# Patient Record
Sex: MALE | Race: WHITE | NOT HISPANIC OR LATINO | Employment: OTHER | URBAN - METROPOLITAN AREA
[De-identification: names, ages, dates, MRNs, and addresses within clinical notes are randomized per-mention and may not be internally consistent; named-entity substitution may affect disease eponyms.]

---

## 2017-05-05 ENCOUNTER — AMBULATORY - HEALTHEAST (OUTPATIENT)
Dept: CARDIOLOGY | Facility: CLINIC | Age: 67
End: 2017-05-05

## 2017-05-08 ENCOUNTER — OFFICE VISIT - HEALTHEAST (OUTPATIENT)
Dept: CARDIOLOGY | Facility: CLINIC | Age: 67
End: 2017-05-08

## 2017-05-08 ENCOUNTER — AMBULATORY - HEALTHEAST (OUTPATIENT)
Dept: CARDIOLOGY | Facility: CLINIC | Age: 67
End: 2017-05-08

## 2017-05-08 DIAGNOSIS — Z82.49 FAMILY HISTORY OF EARLY CAD: ICD-10-CM

## 2017-05-08 DIAGNOSIS — I48.3 TYPICAL ATRIAL FLUTTER (H): ICD-10-CM

## 2017-05-08 DIAGNOSIS — G47.33 OSA ON CPAP: ICD-10-CM

## 2017-05-08 ASSESSMENT — MIFFLIN-ST. JEOR: SCORE: 2286.73

## 2017-05-10 ENCOUNTER — HOSPITAL ENCOUNTER (OUTPATIENT)
Dept: CT IMAGING | Facility: CLINIC | Age: 67
Discharge: HOME | End: 2017-05-10
Attending: NURSE PRACTITIONER

## 2017-05-10 DIAGNOSIS — Z82.49 FAMILY HISTORY OF EARLY CAD: ICD-10-CM

## 2017-05-10 LAB
BSA FOR ECHO PROCEDURE: 2.7 M2
CV CALCIUM SCORE AGATSTON LM: 165
CV CALCIUM SCORING AGATSON LAD: 231
CV CALCIUM SCORING AGATSTON CX: 0
CV CALCIUM SCORING AGATSTON RCA: 33
CV CALCIUM SCORING AGATSTON TOTAL: 429

## 2017-05-10 ASSESSMENT — MIFFLIN-ST. JEOR: SCORE: 2250.44

## 2017-05-11 ENCOUNTER — COMMUNICATION - HEALTHEAST (OUTPATIENT)
Dept: CARDIOLOGY | Facility: CLINIC | Age: 67
End: 2017-05-11

## 2017-05-15 ENCOUNTER — COMMUNICATION - HEALTHEAST (OUTPATIENT)
Dept: CARDIOLOGY | Facility: CLINIC | Age: 67
End: 2017-05-15

## 2017-06-06 ENCOUNTER — COMMUNICATION - HEALTHEAST (OUTPATIENT)
Dept: CARDIOLOGY | Facility: CLINIC | Age: 67
End: 2017-06-06

## 2017-06-06 DIAGNOSIS — I48.3 TYPICAL ATRIAL FLUTTER (H): ICD-10-CM

## 2017-12-26 ENCOUNTER — COMMUNICATION - HEALTHEAST (OUTPATIENT)
Dept: CARDIOLOGY | Facility: CLINIC | Age: 67
End: 2017-12-26

## 2017-12-26 DIAGNOSIS — I48.3 TYPICAL ATRIAL FLUTTER (H): ICD-10-CM

## 2018-03-27 ENCOUNTER — COMMUNICATION - HEALTHEAST (OUTPATIENT)
Dept: CARDIOLOGY | Facility: CLINIC | Age: 68
End: 2018-03-27

## 2018-03-27 DIAGNOSIS — I48.3 TYPICAL ATRIAL FLUTTER (H): ICD-10-CM

## 2019-12-18 ASSESSMENT — MIFFLIN-ST. JEOR: SCORE: 2375.19

## 2019-12-26 ENCOUNTER — SURGERY - HEALTHEAST (OUTPATIENT)
Dept: SURGERY | Facility: HOSPITAL | Age: 69
End: 2019-12-26

## 2019-12-26 ENCOUNTER — ANESTHESIA - HEALTHEAST (OUTPATIENT)
Dept: SURGERY | Facility: HOSPITAL | Age: 69
End: 2019-12-26

## 2021-05-25 ENCOUNTER — RECORDS - HEALTHEAST (OUTPATIENT)
Dept: ADMINISTRATIVE | Facility: CLINIC | Age: 71
End: 2021-05-25

## 2021-05-28 ENCOUNTER — RECORDS - HEALTHEAST (OUTPATIENT)
Dept: ADMINISTRATIVE | Facility: CLINIC | Age: 71
End: 2021-05-28

## 2021-05-29 ENCOUNTER — RECORDS - HEALTHEAST (OUTPATIENT)
Dept: ADMINISTRATIVE | Facility: CLINIC | Age: 71
End: 2021-05-29

## 2021-05-30 VITALS — WEIGHT: 315 LBS | HEIGHT: 70 IN | BODY MASS INDEX: 45.1 KG/M2

## 2021-05-30 VITALS — HEIGHT: 70 IN | WEIGHT: 315 LBS | BODY MASS INDEX: 45.1 KG/M2

## 2021-05-31 ENCOUNTER — RECORDS - HEALTHEAST (OUTPATIENT)
Dept: ADMINISTRATIVE | Facility: CLINIC | Age: 71
End: 2021-05-31

## 2021-06-04 VITALS — WEIGHT: 315 LBS | BODY MASS INDEX: 46.65 KG/M2 | HEIGHT: 69 IN

## 2021-06-04 NOTE — ANESTHESIA POSTPROCEDURE EVALUATION
Patient: Yovani Bauman  EXAM UNDER ANESTHESIA WITH EXCISION OF ANAL SKIN TAG  Anesthesia type: general    Patient location: PACU  Last vitals:   Vitals Value Taken Time   BP  12/26/2019  2:57 PM   Temp  12/26/2019  2:57 PM   Pulse 70 12/26/2019  2:55 PM   Resp 17 12/26/2019  2:50 PM   SpO2 95 % 12/26/2019  2:55 PM   Vitals shown include unvalidated device data.  Post vital signs: stable  Level of consciousness: awake and responds to simple questions  Post-anesthesia pain: pain controlled  Post-anesthesia nausea and vomiting: no  Pulmonary: unassisted, return to baseline  Cardiovascular: stable and blood pressure at baseline  Hydration: adequate  Anesthetic events: no    QCDR Measures:  ASA# 11 - [place]-op Cardiac Arrest: ASA11B - Patient did NOT experience unanticipated cardiac arrest  ASA# 12 - [place]-op Mortality Rate: ASA12B - Patient did NOT die  ASA# 13 - PACU Re-Intubation Rate: ASA13B - Patient did NOT require a new airway mgmt  ASA# 10 - Composite Anes Safety: ASA10A - No serious adverse event    Additional Notes:

## 2021-06-04 NOTE — ANESTHESIA PREPROCEDURE EVALUATION
Anesthesia Evaluation      Patient summary reviewed   No history of anesthetic complications     Airway   Mallampati: II  Neck ROM: full   Pulmonary - normal exam   (+) sleep apnea on CPAP, ,                          Cardiovascular - normal exam  (+) hypertension, dysrhythmias (a flutter), ,      Neuro/Psych    (+) depression,     Endo/Other    (+) obesity,      GI/Hepatic/Renal    (+) GERD,   chronic renal disease,           Dental                         Anesthesia Plan  Planned anesthetic: general endotracheal  Prone. GETA    Sux available. 20 mg ketamine with induction.   ASA 3   Induction: intravenous   Anesthetic plan and risks discussed with: patient    Post-op plan: routine recovery

## 2021-06-04 NOTE — ANESTHESIA CARE TRANSFER NOTE
Last vitals:   Vitals:    12/26/19 1411   BP: 142/67   Pulse: 86   Resp: 18   Temp: 36.5  C (97.7  F)   SpO2: 99%     Patient's level of consciousness is drowsy  Spontaneous respirations: yes  Maintains airway independently: yes  Dentition unchanged: yes  Oropharynx: oropharynx clear of all foreign objects    QCDR Measures:  ASA# 20 - Surgical Safety Checklist: WHO surgical safety checklist completed prior to induction    PQRS# 430 - Adult PONV Prevention: 4558F - Pt received => 2 anti-emetic agents (different classes) preop & intraop  ASA# 8 - Peds PONV Prevention: NA - Not pediatric patient, not GA or 2 or more risk factors NOT present  PQRS# 424 - [place]-op Temp Management: NA - MAC anesthesia or case < 60 minutes  PQRS# 426 - PACU Transfer Protocol: - Transfer of care checklist used  ASA# 14 - Acute Post-op Pain: ASA14B - Patient did NOT experience pain >= 7 out of 10

## 2021-06-05 ENCOUNTER — HEALTH MAINTENANCE LETTER (OUTPATIENT)
Age: 71
End: 2021-06-05

## 2021-06-10 NOTE — PROGRESS NOTES
[facility] HEART [facility]   Arrhythmia Clinic    Assessment/Plan:  Diagnoses and all orders for this visit:    Typical atrial flutter and symptomatic with RVR.  Discussed relationship between atrial flutter and atrial fibrillation.  With brief palpitations in the past of unclear if he has had PAT or atrial fibrillation or flutter in the past.  Discussed until he has a second  episode of atrial flutter cannot say for certain this will be an ongoing issue but likely since it did not happen will happen with normal activities for lone A. Fib/fl.  I discussed common concerns with atrial fib and flutter is heart rate control and stroke protection when needed.  [id number] score of 1 and I am going to leave him on Xarelto as no problems and considering ablation.  Discussed if he has atrial flutter ablation still about 50% incidence of developing atrial fib but otherwise invariably occurs with ongoing flutter that he will develop atrial fib as well.  Discussed right flutter ablation in some detail today.  Will cancel follow-up with Dr. Bailey and have him see Dr. Palacio at next available for consult regarding typical atrial flutter ablation versus PVI in the future if afib documented.  Having some lightheadedness so  will decrease diltiazem from 180 to 120 mg orally every day.  Discussed that early on this can help prevent episodes of atrial flutter.  -     diltiazem (CARDIZEM CD) 120 MG 24 hr capsule; Take 1 capsule (120 mg total) by mouth daily.  Dispense: 30 capsule; Refill: 6    Family history of early CAD in both parents so recommended calcium score but discussed that this is not covered by insurance.  -     CT Cardiac Calcium Score Self Referal; Future; Expected date: 5/8/17    TRENTON on CPAP and uses CPAP consistently every night and also with naps.  He  reports having severe TRENTON and had clinical improvement with CPAP.    40 minutes were spent in face-to-face counseling regarding above diagnoses and options for  treatment.        ______________________________________________________________________    Subjective:    I had the opportunity to see Yovani Bauman at the [facility]. Yovani Bauman is a 66 y.o. male and here for EP follow-up after being hospitalized with typical atrial flutter with RVR and chest pain and palpitations with episode.  Also sweaty and left arm numb at the time.  He was in [facility] and sent  to the emergency room as very dramatic symptoms.  He has no family history of atrial fibrillation but had his mother and father both had premature coronary artery disease with MIs in their 40s.  They were both heavy smokers though.    Yovani Bauman has no previous cardiac history.  He reports palpitations at times for seconds to 10 minutes every months or 3 months in the past.  He is not sure how long this has been going on.  He had no symptoms with previous palpitations.  When he went out of rhythm he knew something was wrong with his rhythm as he could feel it in his chest.  He is retired and helping with construction on a project.  Discussed these under no restriction right now but to take it easier with new meds.   ______________________________________________________________________    Problem List:  Patient Active Problem List   Diagnosis     Depression, major, recurrent     Prostatitis     Uncontrolled hypertension     Typical atrial flutter     CKD (chronic kidney disease) stage 3, GFR 30-59 ml/min     TRENTON on CPAP     Medical History:  Past Medical History:   Diagnosis Date     Depression      HTN (hypertension)      TRENTON on CPAP      Surgical History:  No past surgical history on file.  Social History:  Social History   Substance Use Topics     Smoking status: Never Smoker     Smokeless tobacco: Never Used     Alcohol use No        Review of Systems: Review of Systems:   General: WNL  Eyes: WNL  Ears/Nose/Throat: WNL  Lungs: WNL  Heart: WNL  Stomach: WNL  Bladder:  "WNL  Muscle/Joints: Joint Pain  Skin: WNL  Nervous System: WNL  Mental Health: WNL     Blood: WNL      Family History:  Family History   Problem Relation Age of Onset     Heart attack Mother      Heart attack Father          Allergies:  Allergies   Allergen Reactions     Other Environmental Allergy      Cockroaches, dust mites....the patient had allergy testing done and showed he might me allergic.  Never has had reaction     Shellfish Containing Products      Pt did allergy testing and showed shellfish, pt has never had a reaction     Medications:  Current Outpatient Prescriptions   Medication Sig Dispense Refill     buPROPion (WELLBUTRIN XL) 150 MG 24 hr tablet Take 1 tablet (150 mg total) by mouth daily. 30 tablet 3     buPROPion (WELLBUTRIN XL) 300 MG 24 hr tablet Take 1 tablet (300 mg total) by mouth daily. 30 tablet 3     CHOLECALCIFEROL, VITAMIN D3, (VITAMIN D3 ORAL) Take 2,000 Units by mouth daily.       FEXOFENADINE HCL (ALLEGRA ORAL) Take 1 tablet by mouth daily as needed.       losartan-hydrochlorothiazide (HYZAAR) 100-25 mg per tablet Take 0.5 tablets by mouth daily.  0     rivaroxaban 20 mg Tab Take 1 tablet (20 mg total) by mouth daily with supper. 30 tablet 1     vitamins-lipotropics (LIPO-FLAVONOID PLUS) 200-100 mg Tab Take 2 tablets by mouth 3 (three) times a day.       diltiazem (CARDIZEM CD) 120 MG 24 hr capsule Take 1 capsule (120 mg total) by mouth daily. 30 capsule 6     No current facility-administered medications for this visit.        Objective:   Vital signs:  /80 (Patient Site: Right Arm, Patient Position: Sitting, Cuff Size: Adult Large)  Pulse (!) 56  Resp 16  Ht 5' 10\" (1.778 m)  Wt (!) 333 lb (151 kg)  BMI 47.78 kg/m2      Physical Exam:    GENERAL APPEARANCE: Alert, cooperative and in no acute distress.  HEENT: No scleral icterus. No Xanthelasma. Oral mucuos membranes pink and moist.  NECK: JVP Nl.   CHEST: clear to auscultation  CARDIOVASCULAR: S1, S2 without murmur " ,clicks or rubs. Regular, regular.  Radial and posterior tibial pulses are intact and symetric.   EXTREMITIES: No cyanosis, clubbing or edema.    Results personally reviewed:  Results for orders placed during the hospital encounter of 04/11/17   Echo Complete [ECH10] 04/12/2017    Narrative   Mild left atrial enlargement    Left ventricle ejection fraction is normal. The calculated left   ventricular ejection fraction is 58%. No wall motion abnormality.    No significant valvular heart disease    No previous study for comparison.      4/11/2017 twelve-lead EKG shows typical atrial flutter with RVR of 166.     TSH:   Lab Results   Component Value Date    TSH 2.79 04/11/2017     BNP: No results found for: BNP  BMP:  Lab Results   Component Value Date    CREATININE 1.26 04/11/2017    BUN 27 (H) 04/11/2017     04/11/2017    K 4.4 04/11/2017     04/11/2017    CO2 27 04/11/2017       This note has been dictated using voice recognition software. Any grammatical or context distortions are unintentional and inherent to the software.    AMIRAH JACOB RN, [facility]  [phone]

## 2021-06-15 PROBLEM — I48.3 TYPICAL ATRIAL FLUTTER (H): Status: ACTIVE | Noted: 2017-04-11

## 2021-06-15 PROBLEM — G47.33 OSA ON CPAP: Status: ACTIVE | Noted: 2017-05-08

## 2021-06-29 DIAGNOSIS — I10 UNCONTROLLED HYPERTENSION: ICD-10-CM

## 2021-07-01 RX ORDER — LOSARTAN POTASSIUM AND HYDROCHLOROTHIAZIDE 25; 100 MG/1; MG/1
0.5 TABLET ORAL DAILY
Refills: 0 | OUTPATIENT
Start: 2021-07-01

## 2021-07-01 NOTE — TELEPHONE ENCOUNTER
Never been seen by [facility]. Please call patient and request that they have this prescription filled by PCP. Kim Reddy RN on 7/1/2021 at 10:32 AM

## 2021-09-25 ENCOUNTER — HEALTH MAINTENANCE LETTER (OUTPATIENT)
Age: 71
End: 2021-09-25

## 2021-12-15 ENCOUNTER — APPOINTMENT (OUTPATIENT)
Dept: CT IMAGING | Facility: HOSPITAL | Age: 71
End: 2021-12-15
Attending: EMERGENCY MEDICINE
Payer: COMMERCIAL

## 2021-12-15 ENCOUNTER — HOSPITAL ENCOUNTER (INPATIENT)
Facility: HOSPITAL | Age: 71
LOS: 3 days | Discharge: HOME | End: 2021-12-18
Attending: EMERGENCY MEDICINE | Admitting: INTERNAL MEDICINE
Payer: COMMERCIAL

## 2021-12-15 DIAGNOSIS — E61.1 IRON DEFICIENCY: Primary | ICD-10-CM

## 2021-12-15 DIAGNOSIS — N28.89 RENAL MASS: ICD-10-CM

## 2021-12-15 DIAGNOSIS — S37.019A PERINEPHRIC HEMATOMA: ICD-10-CM

## 2021-12-15 DIAGNOSIS — I10 UNCONTROLLED HYPERTENSION: ICD-10-CM

## 2021-12-15 DIAGNOSIS — R10.9 FLANK PAIN: ICD-10-CM

## 2021-12-15 PROBLEM — R58 RETROPERITONEAL BLEEDING: Status: ACTIVE | Noted: 2021-12-15

## 2021-12-15 LAB
ABO/RH(D): NORMAL
ALBUMIN SERPL-MCNC: 3.1 G/DL (ref 3.5–5)
ALBUMIN SERPL-MCNC: 3.4 G/DL (ref 3.5–5)
ALBUMIN UR-MCNC: 20 MG/DL
ALP SERPL-CCNC: 69 U/L (ref 45–120)
ALP SERPL-CCNC: 75 U/L (ref 45–120)
ALT SERPL W P-5'-P-CCNC: 16 U/L (ref 0–45)
ALT SERPL W P-5'-P-CCNC: 16 U/L (ref 0–45)
ANION GAP SERPL CALCULATED.3IONS-SCNC: 12 MMOL/L (ref 5–18)
ANION GAP SERPL CALCULATED.3IONS-SCNC: 14 MMOL/L (ref 5–18)
ANTIBODY SCREEN: NEGATIVE
APPEARANCE UR: CLEAR
AST SERPL W P-5'-P-CCNC: 11 U/L (ref 0–40)
AST SERPL W P-5'-P-CCNC: 15 U/L (ref 0–40)
BASOPHILS # BLD AUTO: 0.1 10E3/UL (ref 0–0.2)
BASOPHILS NFR BLD AUTO: 1 %
BILIRUB SERPL-MCNC: 0.4 MG/DL (ref 0–1)
BILIRUB SERPL-MCNC: 0.5 MG/DL (ref 0–1)
BILIRUB UR QL STRIP: NEGATIVE
BUN SERPL-MCNC: 21 MG/DL (ref 8–22)
BUN SERPL-MCNC: 22 MG/DL (ref 8–22)
C REACTIVE PROTEIN LHE: 2 MG/DL (ref 0–0.8)
CALCIUM SERPL-MCNC: 8.9 MG/DL (ref 8.5–10.5)
CALCIUM SERPL-MCNC: 9.6 MG/DL (ref 8.5–10.5)
CHLORIDE BLD-SCNC: 103 MMOL/L (ref 98–107)
CHLORIDE BLD-SCNC: 105 MMOL/L (ref 98–107)
CO2 SERPL-SCNC: 20 MMOL/L (ref 22–31)
CO2 SERPL-SCNC: 23 MMOL/L (ref 22–31)
COLOR UR AUTO: ABNORMAL
CREAT SERPL-MCNC: 1.6 MG/DL (ref 0.7–1.3)
CREAT SERPL-MCNC: 1.65 MG/DL (ref 0.7–1.3)
EOSINOPHIL # BLD AUTO: 0.1 10E3/UL (ref 0–0.7)
EOSINOPHIL NFR BLD AUTO: 0 %
ERYTHROCYTE [DISTWIDTH] IN BLOOD BY AUTOMATED COUNT: 14 % (ref 10–15)
ERYTHROCYTE [DISTWIDTH] IN BLOOD BY AUTOMATED COUNT: 14 % (ref 10–15)
GFR SERPL CREATININE-BSD FRML MDRD: 42 ML/MIN/1.73M2
GFR SERPL CREATININE-BSD FRML MDRD: 43 ML/MIN/1.73M2
GLUCOSE BLD-MCNC: 214 MG/DL (ref 70–125)
GLUCOSE BLD-MCNC: 234 MG/DL (ref 70–125)
GLUCOSE UR STRIP-MCNC: 300 MG/DL
HCT VFR BLD AUTO: 34.9 % (ref 40–53)
HCT VFR BLD AUTO: 40.2 % (ref 40–53)
HGB BLD-MCNC: 10.9 G/DL (ref 13.3–17.7)
HGB BLD-MCNC: 11 G/DL (ref 13.3–17.7)
HGB BLD-MCNC: 11.2 G/DL (ref 13.3–17.7)
HGB BLD-MCNC: 13.3 G/DL (ref 13.3–17.7)
HGB UR QL STRIP: ABNORMAL
HOLD SPECIMEN: NORMAL
HYALINE CASTS: 1 /LPF
IMM GRANULOCYTES # BLD: 0.1 10E3/UL
IMM GRANULOCYTES NFR BLD: 1 %
INR PPP: 1.92 (ref 0.9–1.15)
KETONES UR STRIP-MCNC: NEGATIVE MG/DL
LACTATE SERPL-SCNC: 2.3 MMOL/L (ref 0.7–2)
LACTATE SERPL-SCNC: 2.4 MMOL/L (ref 0.7–2)
LACTATE SERPL-SCNC: 2.8 MMOL/L (ref 0.7–2)
LEUKOCYTE ESTERASE UR QL STRIP: NEGATIVE
LYMPHOCYTES # BLD AUTO: 0.5 10E3/UL (ref 0.8–5.3)
LYMPHOCYTES NFR BLD AUTO: 3 %
MCH RBC QN AUTO: 27.7 PG (ref 26.5–33)
MCH RBC QN AUTO: 28.1 PG (ref 26.5–33)
MCHC RBC AUTO-ENTMCNC: 32.1 G/DL (ref 31.5–36.5)
MCHC RBC AUTO-ENTMCNC: 33.1 G/DL (ref 31.5–36.5)
MCV RBC AUTO: 85 FL (ref 78–100)
MCV RBC AUTO: 86 FL (ref 78–100)
MONOCYTES # BLD AUTO: 0.7 10E3/UL (ref 0–1.3)
MONOCYTES NFR BLD AUTO: 5 %
MUCOUS THREADS #/AREA URNS LPF: PRESENT /LPF
NEUTROPHILS # BLD AUTO: 13.2 10E3/UL (ref 1.6–8.3)
NEUTROPHILS NFR BLD AUTO: 90 %
NITRATE UR QL: NEGATIVE
NRBC # BLD AUTO: 0 10E3/UL
NRBC BLD AUTO-RTO: 0 /100
PH UR STRIP: 5 [PH] (ref 5–7)
PLATELET # BLD AUTO: 159 10E3/UL (ref 150–450)
PLATELET # BLD AUTO: 195 10E3/UL (ref 150–450)
POTASSIUM BLD-SCNC: 3.6 MMOL/L (ref 3.5–5)
POTASSIUM BLD-SCNC: 3.9 MMOL/L (ref 3.5–5)
PROT SERPL-MCNC: 6 G/DL (ref 6–8)
PROT SERPL-MCNC: 6.5 G/DL (ref 6–8)
RBC # BLD AUTO: 4.05 10E6/UL (ref 4.4–5.9)
RBC # BLD AUTO: 4.74 10E6/UL (ref 4.4–5.9)
RBC URINE: 13 /HPF
SARS-COV-2 RNA RESP QL NAA+PROBE: NEGATIVE
SODIUM SERPL-SCNC: 137 MMOL/L (ref 136–145)
SODIUM SERPL-SCNC: 140 MMOL/L (ref 136–145)
SP GR UR STRIP: 1.05 (ref 1–1.03)
SPECIMEN EXPIRATION DATE: NORMAL
SQUAMOUS EPITHELIAL: <1 /HPF
UFH PPP CHRO-ACNC: >1.1 IU/ML
UROBILINOGEN UR STRIP-MCNC: <2 MG/DL
WBC # BLD AUTO: 14.4 10E3/UL (ref 4–11)
WBC # BLD AUTO: 14.5 10E3/UL (ref 4–11)
WBC URINE: 2 /HPF

## 2021-12-15 PROCEDURE — 85018 HEMOGLOBIN: CPT | Performed by: INTERNAL MEDICINE

## 2021-12-15 PROCEDURE — 85520 HEPARIN ASSAY: CPT | Performed by: INTERNAL MEDICINE

## 2021-12-15 PROCEDURE — 96365 THER/PROPH/DIAG IV INF INIT: CPT

## 2021-12-15 PROCEDURE — 36415 COLL VENOUS BLD VENIPUNCTURE: CPT | Performed by: INTERNAL MEDICINE

## 2021-12-15 PROCEDURE — 96361 HYDRATE IV INFUSION ADD-ON: CPT

## 2021-12-15 PROCEDURE — 96376 TX/PRO/DX INJ SAME DRUG ADON: CPT

## 2021-12-15 PROCEDURE — 250N000015 HC RX FACTOR IP MED 636 OP 636

## 2021-12-15 PROCEDURE — 87635 SARS-COV-2 COVID-19 AMP PRB: CPT | Performed by: EMERGENCY MEDICINE

## 2021-12-15 PROCEDURE — 84155 ASSAY OF PROTEIN SERUM: CPT | Performed by: INTERNAL MEDICINE

## 2021-12-15 PROCEDURE — 999N000157 HC STATISTIC RCP TIME EA 10 MIN

## 2021-12-15 PROCEDURE — 250N000011 HC RX IP 250 OP 636: Performed by: EMERGENCY MEDICINE

## 2021-12-15 PROCEDURE — 85610 PROTHROMBIN TIME: CPT | Performed by: INTERNAL MEDICINE

## 2021-12-15 PROCEDURE — 74174 CTA ABD&PLVS W/CONTRAST: CPT

## 2021-12-15 PROCEDURE — 81001 URINALYSIS AUTO W/SCOPE: CPT | Performed by: EMERGENCY MEDICINE

## 2021-12-15 PROCEDURE — 250N000013 HC RX MED GY IP 250 OP 250 PS 637: Performed by: STUDENT IN AN ORGANIZED HEALTH CARE EDUCATION/TRAINING PROGRAM

## 2021-12-15 PROCEDURE — 83605 ASSAY OF LACTIC ACID: CPT | Performed by: EMERGENCY MEDICINE

## 2021-12-15 PROCEDURE — 84075 ASSAY ALKALINE PHOSPHATASE: CPT | Performed by: INTERNAL MEDICINE

## 2021-12-15 PROCEDURE — 99285 EMERGENCY DEPT VISIT HI MDM: CPT | Mod: 25

## 2021-12-15 PROCEDURE — 258N000003 HC RX IP 258 OP 636: Performed by: INTERNAL MEDICINE

## 2021-12-15 PROCEDURE — 99223 1ST HOSP IP/OBS HIGH 75: CPT | Mod: GC | Performed by: INTERNAL MEDICINE

## 2021-12-15 PROCEDURE — 96375 TX/PRO/DX INJ NEW DRUG ADDON: CPT

## 2021-12-15 PROCEDURE — C9803 HOPD COVID-19 SPEC COLLECT: HCPCS

## 2021-12-15 PROCEDURE — 84450 TRANSFERASE (AST) (SGOT): CPT | Performed by: INTERNAL MEDICINE

## 2021-12-15 PROCEDURE — 85025 COMPLETE CBC W/AUTO DIFF WBC: CPT | Performed by: EMERGENCY MEDICINE

## 2021-12-15 PROCEDURE — 250N000013 HC RX MED GY IP 250 OP 250 PS 637: Performed by: INTERNAL MEDICINE

## 2021-12-15 PROCEDURE — 36415 COLL VENOUS BLD VENIPUNCTURE: CPT | Performed by: EMERGENCY MEDICINE

## 2021-12-15 PROCEDURE — 74176 CT ABD & PELVIS W/O CONTRAST: CPT

## 2021-12-15 PROCEDURE — 120N000001 HC R&B MED SURG/OB

## 2021-12-15 PROCEDURE — 82374 ASSAY BLOOD CARBON DIOXIDE: CPT | Performed by: EMERGENCY MEDICINE

## 2021-12-15 PROCEDURE — 85027 COMPLETE CBC AUTOMATED: CPT | Performed by: INTERNAL MEDICINE

## 2021-12-15 PROCEDURE — 86141 C-REACTIVE PROTEIN HS: CPT | Performed by: EMERGENCY MEDICINE

## 2021-12-15 PROCEDURE — 82040 ASSAY OF SERUM ALBUMIN: CPT | Performed by: EMERGENCY MEDICINE

## 2021-12-15 PROCEDURE — 93005 ELECTROCARDIOGRAM TRACING: CPT | Performed by: STUDENT IN AN ORGANIZED HEALTH CARE EDUCATION/TRAINING PROGRAM

## 2021-12-15 PROCEDURE — 87040 BLOOD CULTURE FOR BACTERIA: CPT | Performed by: EMERGENCY MEDICINE

## 2021-12-15 PROCEDURE — 83605 ASSAY OF LACTIC ACID: CPT | Performed by: INTERNAL MEDICINE

## 2021-12-15 PROCEDURE — 258N000003 HC RX IP 258 OP 636: Performed by: EMERGENCY MEDICINE

## 2021-12-15 PROCEDURE — 86923 COMPATIBILITY TEST ELECTRIC: CPT | Performed by: INTERNAL MEDICINE

## 2021-12-15 PROCEDURE — 86901 BLOOD TYPING SEROLOGIC RH(D): CPT | Performed by: EMERGENCY MEDICINE

## 2021-12-15 PROCEDURE — 250N000011 HC RX IP 250 OP 636: Performed by: STUDENT IN AN ORGANIZED HEALTH CARE EDUCATION/TRAINING PROGRAM

## 2021-12-15 RX ORDER — ONDANSETRON 4 MG/1
4 TABLET, ORALLY DISINTEGRATING ORAL EVERY 6 HOURS PRN
Status: DISCONTINUED | OUTPATIENT
Start: 2021-12-15 | End: 2021-12-18 | Stop reason: HOSPADM

## 2021-12-15 RX ORDER — PROCHLORPERAZINE 25 MG
12.5 SUPPOSITORY, RECTAL RECTAL EVERY 12 HOURS PRN
Status: DISCONTINUED | OUTPATIENT
Start: 2021-12-15 | End: 2021-12-18 | Stop reason: HOSPADM

## 2021-12-15 RX ORDER — LIDOCAINE 40 MG/G
CREAM TOPICAL
Status: DISCONTINUED | OUTPATIENT
Start: 2021-12-15 | End: 2021-12-18 | Stop reason: HOSPADM

## 2021-12-15 RX ORDER — CEFTRIAXONE 2 G/1
2 INJECTION, POWDER, FOR SOLUTION INTRAMUSCULAR; INTRAVENOUS EVERY 24 HOURS
Status: DISCONTINUED | OUTPATIENT
Start: 2021-12-16 | End: 2021-12-18 | Stop reason: HOSPADM

## 2021-12-15 RX ORDER — LOSARTAN POTASSIUM 50 MG/1
50 TABLET ORAL DAILY
Status: DISCONTINUED | OUTPATIENT
Start: 2021-12-15 | End: 2021-12-18 | Stop reason: HOSPADM

## 2021-12-15 RX ORDER — BUPROPION HYDROCHLORIDE 300 MG/1
300 TABLET ORAL DAILY
Status: DISCONTINUED | OUTPATIENT
Start: 2021-12-16 | End: 2021-12-18 | Stop reason: HOSPADM

## 2021-12-15 RX ORDER — HYDRALAZINE HYDROCHLORIDE 20 MG/ML
5 INJECTION INTRAMUSCULAR; INTRAVENOUS EVERY 4 HOURS PRN
Status: DISCONTINUED | OUTPATIENT
Start: 2021-12-15 | End: 2021-12-18 | Stop reason: HOSPADM

## 2021-12-15 RX ORDER — ONDANSETRON 2 MG/ML
4 INJECTION INTRAMUSCULAR; INTRAVENOUS ONCE
Status: COMPLETED | OUTPATIENT
Start: 2021-12-15 | End: 2021-12-15

## 2021-12-15 RX ORDER — CEFTRIAXONE 1 G/1
1 INJECTION, POWDER, FOR SOLUTION INTRAMUSCULAR; INTRAVENOUS ONCE
Status: COMPLETED | OUTPATIENT
Start: 2021-12-15 | End: 2021-12-15

## 2021-12-15 RX ORDER — ONDANSETRON 2 MG/ML
4 INJECTION INTRAMUSCULAR; INTRAVENOUS EVERY 6 HOURS PRN
Status: DISCONTINUED | OUTPATIENT
Start: 2021-12-15 | End: 2021-12-18 | Stop reason: HOSPADM

## 2021-12-15 RX ORDER — IOPAMIDOL 755 MG/ML
100 INJECTION, SOLUTION INTRAVASCULAR ONCE
Status: COMPLETED | OUTPATIENT
Start: 2021-12-15 | End: 2021-12-15

## 2021-12-15 RX ORDER — DILTIAZEM HYDROCHLORIDE 180 MG/1
180 CAPSULE, COATED, EXTENDED RELEASE ORAL DAILY
Status: DISCONTINUED | OUTPATIENT
Start: 2021-12-15 | End: 2021-12-18 | Stop reason: HOSPADM

## 2021-12-15 RX ORDER — LOSARTAN POTASSIUM AND HYDROCHLOROTHIAZIDE 25; 100 MG/1; MG/1
1 TABLET ORAL DAILY
Status: ON HOLD | COMMUNITY
Start: 2021-06-29 | End: 2021-12-17

## 2021-12-15 RX ORDER — VENLAFAXINE HYDROCHLORIDE 75 MG/1
75 CAPSULE, EXTENDED RELEASE ORAL DAILY
COMMUNITY
Start: 2021-09-08

## 2021-12-15 RX ORDER — SODIUM CHLORIDE 9 MG/ML
INJECTION, SOLUTION INTRAVENOUS CONTINUOUS
Status: DISCONTINUED | OUTPATIENT
Start: 2021-12-15 | End: 2021-12-16 | Stop reason: CLARIF

## 2021-12-15 RX ORDER — LEUPROLIDE ACETATE 22.5 MG
22.5 KIT SUBCUTANEOUS
COMMUNITY
Start: 2021-01-06

## 2021-12-15 RX ORDER — VENLAFAXINE HYDROCHLORIDE 75 MG/1
75 CAPSULE, EXTENDED RELEASE ORAL DAILY
Status: DISCONTINUED | OUTPATIENT
Start: 2021-12-16 | End: 2021-12-18 | Stop reason: HOSPADM

## 2021-12-15 RX ORDER — PROCHLORPERAZINE MALEATE 5 MG
5 TABLET ORAL EVERY 6 HOURS PRN
Status: DISCONTINUED | OUTPATIENT
Start: 2021-12-15 | End: 2021-12-18 | Stop reason: HOSPADM

## 2021-12-15 RX ORDER — ATORVASTATIN CALCIUM 10 MG/1
20 TABLET, FILM COATED ORAL AT BEDTIME
Status: DISCONTINUED | OUTPATIENT
Start: 2021-12-16 | End: 2021-12-18 | Stop reason: HOSPADM

## 2021-12-15 RX ORDER — POLYETHYLENE GLYCOL 3350 17 G/17G
17 POWDER, FOR SOLUTION ORAL DAILY
Status: DISCONTINUED | OUTPATIENT
Start: 2021-12-15 | End: 2021-12-18 | Stop reason: HOSPADM

## 2021-12-15 RX ORDER — SODIUM CHLORIDE, SODIUM LACTATE, POTASSIUM CHLORIDE, CALCIUM CHLORIDE 600; 310; 30; 20 MG/100ML; MG/100ML; MG/100ML; MG/100ML
INJECTION, SOLUTION INTRAVENOUS CONTINUOUS
Status: DISCONTINUED | OUTPATIENT
Start: 2021-12-15 | End: 2021-12-15

## 2021-12-15 RX ORDER — DILTIAZEM HYDROCHLORIDE 30 MG/1
30 TABLET, FILM COATED ORAL ONCE
Status: COMPLETED | OUTPATIENT
Start: 2021-12-15 | End: 2021-12-15

## 2021-12-15 RX ORDER — FAMOTIDINE 20 MG/1
20 TABLET, FILM COATED ORAL 2 TIMES DAILY
Status: DISCONTINUED | OUTPATIENT
Start: 2021-12-15 | End: 2021-12-18 | Stop reason: HOSPADM

## 2021-12-15 RX ADMIN — DILTIAZEM HYDROCHLORIDE 180 MG: 180 CAPSULE, COATED, EXTENDED RELEASE ORAL at 11:48

## 2021-12-15 RX ADMIN — LOSARTAN POTASSIUM 50 MG: 50 TABLET, FILM COATED ORAL at 14:00

## 2021-12-15 RX ADMIN — HYDROMORPHONE HYDROCHLORIDE 0.5 MG: 1 INJECTION, SOLUTION INTRAMUSCULAR; INTRAVENOUS; SUBCUTANEOUS at 22:03

## 2021-12-15 RX ADMIN — HYDROMORPHONE HYDROCHLORIDE 0.5 MG: 1 INJECTION, SOLUTION INTRAMUSCULAR; INTRAVENOUS; SUBCUTANEOUS at 12:20

## 2021-12-15 RX ADMIN — FAMOTIDINE 20 MG: 20 TABLET, FILM COATED ORAL at 10:41

## 2021-12-15 RX ADMIN — SODIUM CHLORIDE 1000 ML: 9 INJECTION, SOLUTION INTRAVENOUS at 05:39

## 2021-12-15 RX ADMIN — SODIUM CHLORIDE: 9 INJECTION, SOLUTION INTRAVENOUS at 14:53

## 2021-12-15 RX ADMIN — IOPAMIDOL 100 ML: 755 INJECTION, SOLUTION INTRAVENOUS at 06:35

## 2021-12-15 RX ADMIN — SODIUM CHLORIDE 500 ML: 9 INJECTION, SOLUTION INTRAVENOUS at 03:47

## 2021-12-15 RX ADMIN — CEFTRIAXONE SODIUM 1 G: 1 INJECTION, POWDER, FOR SOLUTION INTRAMUSCULAR; INTRAVENOUS at 05:39

## 2021-12-15 RX ADMIN — HYDROMORPHONE HYDROCHLORIDE 1 MG: 1 INJECTION, SOLUTION INTRAMUSCULAR; INTRAVENOUS; SUBCUTANEOUS at 03:49

## 2021-12-15 RX ADMIN — DILTIAZEM HYDROCHLORIDE 30 MG: 30 TABLET, FILM COATED ORAL at 17:13

## 2021-12-15 RX ADMIN — HYDROMORPHONE HYDROCHLORIDE 0.5 MG: 1 INJECTION, SOLUTION INTRAMUSCULAR; INTRAVENOUS; SUBCUTANEOUS at 13:51

## 2021-12-15 RX ADMIN — HYDROMORPHONE HYDROCHLORIDE 0.5 MG: 1 INJECTION, SOLUTION INTRAMUSCULAR; INTRAVENOUS; SUBCUTANEOUS at 05:00

## 2021-12-15 RX ADMIN — ONDANSETRON 4 MG: 2 INJECTION INTRAMUSCULAR; INTRAVENOUS at 03:46

## 2021-12-15 RX ADMIN — HYDROMORPHONE HYDROCHLORIDE 0.5 MG: 1 INJECTION, SOLUTION INTRAMUSCULAR; INTRAVENOUS; SUBCUTANEOUS at 07:40

## 2021-12-15 ASSESSMENT — ACTIVITIES OF DAILY LIVING (ADL)
ADLS_ACUITY_SCORE: 5
ADLS_ACUITY_SCORE: 3
ADLS_ACUITY_SCORE: 5
ADLS_ACUITY_SCORE: 3
ADLS_ACUITY_SCORE: 5
ADLS_ACUITY_SCORE: 3
DEPENDENT_IADLS:: INDEPENDENT
ADLS_ACUITY_SCORE: 5
ADLS_ACUITY_SCORE: 3
ADLS_ACUITY_SCORE: 3
ADLS_ACUITY_SCORE: 5
ADLS_ACUITY_SCORE: 3

## 2021-12-15 ASSESSMENT — ENCOUNTER SYMPTOMS
DIAPHORESIS: 1
NAUSEA: 1
FLANK PAIN: 1
BACK PAIN: 1
DIFFICULTY URINATING: 0
VOMITING: 0
DIARRHEA: 1

## 2021-12-15 ASSESSMENT — MIFFLIN-ST. JEOR
SCORE: 2372.45
SCORE: 2336.16

## 2021-12-15 NOTE — PROGRESS NOTES
"Pt resting in bed with home cpap on.  Pt states he is feeling better ever since he was able to get his home cpap here for use.  Pt independent with Cpap.  Blood pressure 133/74, pulse 106, temperature 98.4  F (36.9  C), temperature source Oral, resp. rate 24, height 1.778 m (5' 10\"), weight (!) 160.1 kg (353 lb), SpO2 95 %.      "

## 2021-12-15 NOTE — SIGNIFICANT EVENT
"Significant Event Note    Time of event: 11:02 AM December 15, 2021    Description of event:  Noted HR of 130s on monitor.   Patient asx without palpitations, lightheadedness, chest pain, dyspnea, etc.   Known hx afib/flutter. Usually on dilt and rivaroxaban. Hasn't taken dilt in 2 days.   No EKG done in the ED thus far.     BP (!) 163/100   Pulse 114   Temp 97.7  F (36.5  C) (Oral)   Resp 20   Ht 1.778 m (5' 10\")   Wt (!) 156.5 kg (345 lb)   SpO2 95%   BMI 49.50 kg/m    Gen: appears comfortable   CV: Irregularly irregular - sounds tachycardic. Appears reasonably well perfused.     Plan:  Ordered EKG stat -- confirms atrial fib (no flutter).   Give usual dose oral dilt - confirmed with Dr. Keen and ordered.   Hold anticoagulant given ?renal bleed. Urologist consulted.     Discussed with: bedside nurse and supervising physician, Dr. Leonila Aguilera DO      Addendum   1:41 PM    Left flank pain is worse, now 6-7/10.   No more orders for pain meds - they are out.   BP (!) 187/82   Pulse 118   Temp 97.7  F (36.5  C) (Oral)   Resp 20   Ht 1.778 m (5' 10\")   Wt (!) 156.5 kg (345 lb)   SpO2 94%   BMI 49.50 kg/m    Appears in mild pain.   Tender to left flank.     Will check STAT hemoglobin.   Reordered dilaudid PRN for ongoing hospitalization.     Leighann Aguilera DO   [facility] Medicine Resident   Pager#[phone]            "

## 2021-12-15 NOTE — ED PROVIDER NOTES
"ED SIGNOUT  Date/Time:12/15/2021 7:12 AM    Patient signed out to me by my colleague, Dr. Benjamin MD.  Please see their note for complete history and physical. Plan to follow up on CTA, call IR, and admission.      The creation of this record is based on the scribe s observations of the work being performed by Dr. Flaco MD and the provider s statements to them. It was created on their behalf by Micki Trotter a trained medical scribe. This document has been checked and approved by the attending provider.      REMAINING ED WORKUP:    Vitals:  BP (!) 197/113   Pulse 103   Temp 97.7  F (36.5  C) (Oral)   Resp 20   Ht 1.778 m (5' 10\")   Wt (!) 156.5 kg (345 lb)   SpO2 95%   BMI 49.50 kg/m        Pertinent labs results reviewed   Results for orders placed or performed during the hospital encounter of 12/15/21   Abd/pelvis CT no contrast - [facility] Protocol    Impression    IMPRESSION:   1.  There is a large acute left perinephric hematoma. There is also likely a significant subcapsular hematoma involving the left kidney as well. Evaluation for active bleeding cannot be made on a noncontrast study. CTA could be obtained in further   evaluation if indicated.    2.  There is a large heterogeneous exophytic 8 cm mass arising from the left kidney which could potentially be the source of the acute bleed although not definitive. This lesion is concerning for potential malignancy. Follow-up dedicated MRI of the   kidneys recommended for further evaluation when clinically appropriate.    3.  Large benign 12 cm cyst upper pole right kidney as well as a stable benign angiomyolipoma in the right kidney anteriorly.    4.  Postop prostatectomy. No adenopathy.      Findings called to Dr. Alexander on 12/15/2021 at 5:00 AM.     Extra Blue Top Tube   Result Value Ref Range    Hold Specimen [facility]    Extra Red Top Tube   Result Value Ref Range    Hold Specimen [facility]    Extra Green Top (Lithium Heparin) Tube   Result Value Ref Range    Hold " Specimen [facility]    Extra Purple Top Tube   Result Value Ref Range    Hold Specimen [facility]    Comprehensive metabolic panel   Result Value Ref Range    Sodium 137 136 - 145 mmol/L    Potassium 3.6 3.5 - 5.0 mmol/L    Chloride 103 98 - 107 mmol/L    Carbon Dioxide (CO2) 20 (L) 22 - 31 mmol/L    Anion Gap 14 5 - 18 mmol/L    Urea Nitrogen 21 8 - 22 mg/dL    Creatinine 1.65 (H) 0.70 - 1.30 mg/dL    Calcium 9.6 8.5 - 10.5 mg/dL    Glucose 234 (H) 70 - 125 mg/dL    Alkaline Phosphatase 75 45 - 120 U/L    AST 15 0 - 40 U/L    ALT 16 0 - 45 U/L    Protein Total 6.5 6.0 - 8.0 g/dL    Albumin 3.4 (L) 3.5 - 5.0 g/dL    Bilirubin Total 0.5 0.0 - 1.0 mg/dL    GFR Estimate 42 (L) >60 mL/min/1.73m2   CRP inflammation   Result Value Ref Range    CRP 2.0 (H) 0.0-<0.8 mg/dL   Lactic acid whole blood   Result Value Ref Range    Lactic Acid 2.8 (H) 0.7 - 2.0 mmol/L   CBC with platelets and differential   Result Value Ref Range    WBC Count 14.5 (H) 4.0 - 11.0 10e3/uL    RBC Count 4.74 4.40 - 5.90 10e6/uL    Hemoglobin 13.3 13.3 - 17.7 g/dL    Hematocrit 40.2 40.0 - 53.0 %    MCV 85 78 - 100 fL    MCH 28.1 26.5 - 33.0 pg    MCHC 33.1 31.5 - 36.5 g/dL    RDW 14.0 10.0 - 15.0 %    Platelet Count 195 150 - 450 10e3/uL    % Neutrophils 90 %    % Lymphocytes 3 %    % Monocytes 5 %    % Eosinophils 0 %    % Basophils 1 %    % Immature Granulocytes 1 %    NRBCs per 100 WBC 0 <1 /100    Absolute Neutrophils 13.2 (H) 1.6 - 8.3 10e3/uL    Absolute Lymphocytes 0.5 (L) 0.8 - 5.3 10e3/uL    Absolute Monocytes 0.7 0.0 - 1.3 10e3/uL    Absolute Eosinophils 0.1 0.0 - 0.7 10e3/uL    Absolute Basophils 0.1 0.0 - 0.2 10e3/uL    Absolute Immature Granulocytes 0.1 <=0.4 10e3/uL    Absolute NRBCs 0.0 10e3/uL   Adult Type and Screen   Result Value Ref Range    ABO/RH(D) AB POS     Antibody Screen Negative Negative    SPECIMEN EXPIRATION DATE [id number]        Pertinent imaging reviewed   Please see official radiology report.  Abd/pelvis CT no contrast - [facility]  Protocol   Final Result   IMPRESSION:    1.  There is a large acute left perinephric hematoma. There is also likely a significant subcapsular hematoma involving the left kidney as well. Evaluation for active bleeding cannot be made on a noncontrast study. CTA could be obtained in further    evaluation if indicated.      2.  There is a large heterogeneous exophytic 8 cm mass arising from the left kidney which could potentially be the source of the acute bleed although not definitive. This lesion is concerning for potential malignancy. Follow-up dedicated MRI of the    kidneys recommended for further evaluation when clinically appropriate.      3.  Large benign 12 cm cyst upper pole right kidney as well as a stable benign angiomyolipoma in the right kidney anteriorly.      4.  Postop prostatectomy. No adenopathy.         Findings called to Dr. Alexander on 12/15/2021 at 5:00 AM.         CTA Abdomen Pelvis with Contrast    (Results Pending)        Interventions  Medications   HYDROmorphone (DILAUDID) injection 0.5 mg (0.5 mg Intravenous Given 12/15/21 0500)   HYDROmorphone (DILAUDID) injection 1 mg (1 mg Intravenous Given 12/15/21 0349)   ondansetron (ZOFRAN) injection 4 mg (4 mg Intravenous Given 12/15/21 0346)   0.9% sodium chloride BOLUS (0 mLs Intravenous Stopped 12/15/21 0428)   0.9% sodium chloride BOLUS (1,000 mLs Intravenous New Bag 12/15/21 0539)   cefTRIAXone (ROCEPHIN) 1 g vial to attach to  mL bag for ADULTS or NS 50 mL bag for PEDS (0 g Intravenous Stopped 12/15/21 0648)   iopamidol (ISOVUE-370) solution 100 mL (100 mLs Intravenous Given 12/15/21 0635)        ED Course/MDM:  7:08 AM Signout accepted from Dr. Benjamin MD.  Prior records were reviewed.  Diagnostics from this visit are reviewed.  8:27 AM spoke to Dr. Kauffman, on-call for IR.  At this time he does not recommend need for emergent intervention, recommends urology consultation and if patient declines then embolization would be warranted.    8:32  AM I introduced myself to the patient.  Exam is benign and patient appears comfortable.  Updated patient on imaging finding and admission course.    I spoke with interventional radiology who at this time does not recommend need for emergent intervention, they recommended close observation, urology consultation and intervention if patient's clinical situation deteriorated.  Spoke with the admitting physician who spoke with pharmacy who is putting in medications for anticoagulation reversal.  On my exam the patient appeared quite well and comfortable.  Patient will be admitted for continued consultation and care.    ED Course as of 12/15/21 0712   Wed Dec 15, 2021   0341 Afebrile.  Vital signs here with some tachycardia, tachypnea.  Likely secondary to discomfort.  Patient coming in for evaluation of left-sided flank pain.  Started earlier this evening.  Initially in back and now radiating around to left flank and left abdomen.  Consistent, sharp, stabbing.  Associated with nausea.  Has been diaphoretic secondary to the pain.  No pain in his back or chest.  Does have prostate cancer, currently undergoing radiation therapy.  No history of any mets to bones.  Had some cancer noted in lymph nodes.  We will keep close eye on his heart rate and respiration rate.  At this point I believe this is secondary to the pain in his back.  Pain that he is describing could be secondary to urinary issues versus hydronephrosis versus Alan versus kidney stone.  However with flank pain and history of prostate cancer also difficult to rule out PE at this junction.  Will initially start with dry CT scan abdomen pelvis, check labs, CRP, urinalysis.  If no evidence of stone will proceed with PE run to rule out.Physical exam for patient here appears moderately uncomfortable, diaphoretic in room.  Has left-sided CVA tenderness as well as some mild left flank tenderness to palpation.  Otherwise palpation of the abdomen without any significant  increased pain.  Lungs are clear to auscultation bilaterally, heart regular tachycardic but with regular rhythm.  Remainder unremarkable.   0417 Patient's labs coming back with lactic acid elevated at 2.8.  His CRP is elevated here as well as a mild elevation of his white blood cell count.  Will add on blood cultures, will give a dose of Rocephin here.  Still awaiting remainder of his imaging work-up.   0418 Patient's creatinine here 1.64.  Previous creatinine comparison from [facility] on 2/10/2021 was 1.33.   0639 Patient's try CT back here with evidence of large subcapsular hematoma and bleeding around his left kidney, possible kidney mass noted as well.  Patient updated as to plan.  Spoke with radiologist, they are requesting CTA to evaluate for any active bleeding.  I did call and speak with IR who stated to call them back if CTA showed any active bleeding.  Patient and patient's wife Updated as to plan.  Plan on CTA, admit and possible intervention.           1. Perinephric hematoma    2. Renal mass    3. Flank pain            [facility] Emergency Department          Jonathan Sam MD  12/15/21 [id number]

## 2021-12-15 NOTE — CONSULTS
[facility] CONSULT       Type of consult: inpatient  Place of service: [facility]   Reason for consult: left renal mass with subcapsular hematoma   Requested by: Dr. Keen    History of present illness:   Yovani Bauman is a 71 year old male pertinent medical history of recurrent prostate cancer, morbid obesity, atrial flutter on xeralt, and hypertension admitted to the hospital for left flank pain, left renal mass and left subcapsular hematoma. Urology was consulted for left renal mass noted on CT abdomen and pelvis. History is obtained from patient, and chart review.     Patient states 2 days of left flank pain, which became severe yesterday causing him to seek emergency care at [facility] ED. Given left flank pain and tachycardia   Lab:Creatinine 1.62, CRP 2.0, Lactic acid 2.8, WBC 14.5, Blood culture pending UA clear yellow with 13 RBC, noninfectious.  CTA remarkable for  1.  Large left subcapsular left perinephric hematoma with extension into the retrorenal space. Small focus of venous active extravasation, 8.3 x 6 cm exophytic masslike lesion arising from the medial left kidney concerning for a solid renal mass, though hemorrhagic cyst may be considered. Recommend consultation with urology to exclude renal cell carcinoma, small left pleural effusion.   His blood pressure has remained stable. He is in afib, not flutter.  I did exam patient in the ED given urgent consults.Currently patient states some left flank pain. No fevers, dizziness, hematuria, flank trauma or urinary symptoms.       Urologic history   Patient first diagnosed with prostate cancer in 2004.  s/p prostatectomy 2005 for Martin 3 + 4, pT3b, N0, R1 adenocarcinoma of the prostate. . He had increased PSA therefore underwent electron beam radiation 2006. Patient followed  Dr. Walters through 2014, PSA remained low. He had episodes of hematuria with negative hematuria work up in 2014: urine cytology with urothelial cell  clusters identified. No high grade dysplasia or high grade malignancy identified in this sample. Moderate acute inflammation. CT urogram with renal cysts and right renal angiomyolipoma. Cystoscopy without bladder lesion. Patient then had PSA followed by PCP, noticed increasing levels in 2017/2018 and patient then sought care at [facility]. July 2019 ct guided cryoablation of left vesicourethral recurrent prostate cancer followed by [facility] 2019 with no evidence of residual/recurrent cancer in prostate bed. 10/2021 pet scan with no activity at the left vesicourethral anastomosis however activity at the bilateral common iliac notes and external iliac lymph nodes. 1/2021 PSA 3.6, started Eligard ( 3 months) PSA then 0.66. July 2021: PSA 0.42 ng/mL. 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 Last dose of Eligard 10/8/2021, nest dose due after 12/31/2021         Past medical history:  Past Medical History:   Diagnosis Date     ASCVD (arteriosclerotic cardiovascular disease)      Atrial flutter (H)      Depression      Edema      GERD (gastroesophageal reflux disease)      High triglycerides      HTN (hypertension)      Low vitamin B12 level      Metabolic syndrome      TRENTON on CPAP     CPAP     Prostate CA (H)        Past surgical history  Past Surgical History:   Procedure Laterality Date     CHOLECYSTECTOMY       CRYOABLATION  07/2019     HYDROCELECTOMY SCROTAL       KNEE SURGERY       [place] SURG DIAGNOSTIC EXAM, ANORECTAL N/A 12/26/2019    Procedure: EXAM UNDER ANESTHESIA WITH EXCISION OF ANAL SKIN TAG;  Surgeon: Lenny Ramsey MD;  [place] [facility]  Service: General     PROSTATE SURGERY       VASECTOMY         Social  history  Social History     Socioeconomic History     Marital status:      Spouse name: Not on file     Number of children: Not on file     Years of education: Not on file     Highest education level: Not on file   Occupational History     Not on file   Tobacco Use     Smoking status: Never Smoker     Smokeless tobacco: Never Used   Substance and Sexual Activity     Alcohol use: Yes     Comment: Alcoholic Drinks/day: occassional     Drug use: No     Sexual activity: Not on file   Other Topics Concern     Not on file   Social History Narrative     Not on file     Social Determinants of Health     Financial Resource Strain: Not on file   Food Insecurity: Not on file   Transportation Needs: Not on file   Physical Activity: Not on file   Stress: Not on file   Social Connections: Not on file   Intimate Partner Violence: Not on file   Housing Stability: Not on file         Medications  Current Facility-Administered Medications   Medication     [START ON 12/16/2021] atorvastatin (LIPITOR) tablet 20 mg     [START ON 12/16/2021] buPROPion (WELLBUTRIN XL) 24 hr tablet 300 mg     [START ON 12/16/2021] cefTRIAXone (ROCEPHIN) 2 g vial to attach to  ml bag for ADULTS or NS 50 ml bag for PEDS     famotidine (PEPCID) tablet 20 mg     hydrALAZINE (APRESOLINE) injection 5 mg     HYDROmorphone (DILAUDID) injection 0.5 mg     lidocaine (LMX4) cream     lidocaine 1 % 0.1-1 mL     melatonin tablet 1 mg     ondansetron (ZOFRAN-ODT) ODT tab 4 mg    Or     ondansetron (ZOFRAN) injection 4 mg     polyethylene glycol (MIRALAX) Packet 17 g     prochlorperazine (COMPAZINE) injection 5 mg    Or     prochlorperazine (COMPAZINE) tablet 5 mg    Or     prochlorperazine (COMPAZINE) suppository 12.5 mg     prothrombin 4 factor complex concentrate (KCENTRA) infusion 5,000 Units     sodium chloride (PF) 0.9% PF flush 3 mL     sodium chloride (PF) 0.9% PF flush 3 mL     [START ON 12/16/2021] venlafaxine (EFFEXOR-XR) 24 hr capsule 75 mg  "    Current Outpatient Medications   Medication     atorvastatin (LIPITOR) 20 MG tablet     buPROPion (WELLBUTRIN XL) 300 MG 24 hr tablet     diltiazem (CARDIZEM CD) 180 MG 24 hr capsule     leuprolide, 3 Month, (ELIGARD) 22.5 MG     losartan-hydrochlorothiazide (HYZAAR) 100-25 MG tablet     rivaroxaban 20 mg Tab     venlafaxine (EFFEXOR-XR) 75 MG 24 hr capsule       Allergies  Allergies   Allergen Reactions     Other Environmental Allergy Unknown     Cockroaches, dust mites....the patient had allergy testing done and showed he might me allergic.  Never has had reaction     Shellfish Containing Products [Shellfish-Derived Products] Unknown     Pt did allergy testing and showed shellfish, pt has never had a reaction       Review of systems  12 point review of system is otherwise negative except what is stated in HPI    Physical exam:  BP (!) 167/80   Pulse 108   Temp 97.7  F (36.5  C) (Oral)   Resp 20   Ht 1.778 m (5' 10\")   Wt (!) 156.5 kg (345 lb)   SpO2 97%   BMI 49.50 kg/m     GENERAL: NAD, alert, cooperative morbidly obese.   HEAD: normocephalic/atraumatic   ABDOMEN: Soft, + tender, non distended, no palpable masses, no rebound or peritoneal signs, and + left CVA tenderness  SKIN: no rashes or lesions  MUSCULOSKELETAL: moves all four extremities equally, no pedal edema  PSYCHOLOGICAL: alert and oriented, answers questions appropriately    Labs:   Lab Results   Component Value Date    WBC 14.4 (H) 12/15/2021    HGB 11.2 (L) 12/15/2021    HCT 34.9 (L) 12/15/2021     12/15/2021    ALT 16 12/15/2021    AST 15 12/15/2021     12/15/2021    BUN 21 12/15/2021    CO2 20 (L) 12/15/2021    INR 1.92 (H) 12/15/2021       Lab Results   Component Value Date    NITRITE Negative 12/15/2021        I have personally reviewed these labs.     Imaging:  EXAM: CTA ABDOMEN PELVIS WITH CONTRAST  LOCATION: [facility]  DATE/TIME: 12/15/2021 6:17 AM  IMPRESSION:  1.  Large left subcapsular left " perinephric hematoma with extension into the retrorenal space. Small focus of venous active extravasation (10, 107).  2.  8.3 x 6 cm exophytic masslike lesion arising from the medial left kidney concerning for a solid renal mass, though hemorrhagic cyst may be considered. Recommend consultation with urology to exclude renal cell carcinoma.  3.  Bilateral simple renal cysts, no follow-up required.  4.  Status post cholecystectomy.  5.  Small left pleural effusion.  6.  Mild atherosclerotic vascular disease and coronary artery disease.  7.  Status post prostatectomy.  I have reviewed the imaging reports above.     Assessment/plan:   Yovani Bauman is being seen by [facility] for renal mass on the left side measuring 8.3x 6cm with large left subcapsular left perinephric hematoma with extension into the retrorenal space. Small focus of venous active extravasation. He also has recurrent prostate cancer receiving daily beam radiation.      -Hg 11.2 at 9:11 am, 13.3 at 3:007 am  - Patient currently hemodynamically stable with normal blood pressure. Tachycardia likely related to afib.  -Discussed patient with Dr. Cazares. Subcapsular hematomas typically  managed conservatively unless patient is hemodynamically unstable. Often will not need intervention as space is self limiting, he did review CT report. Recommends bedrest,IV analgesia serial hg, transfuse as necessary and monitor for response. Reverse anticoagulation and hold Xeralto. Given renal mass would prefer to limit IR embolization unless patient absolutely needs the procedure for clinical/ hemodynamic instability, as this can complicate nephrectomy in the future as well as risk of additional bleeding.  - Patient with large renal mass, will likely need nephrectomy in the near future. Patient will follow closely outpatient with Dr. Cazares to discuss treatment options.   -Early castrate resistant recurrent prostate cancer. He is on androgen depravation  therapy (next Eligard due after 12/31/2021) and  receiving daily beam radiation in the outpatient setting. Would likely benefit from oncology consult and input in regards to his daily radiation therapy.    -Reviewed [facility] notes via care everywhere, prior images/labs as well as [facility].  - Urology will continue to follow           Thank you for consulting [facility] regarding this patient's care. Please contact us with questions or concerns.     Nayeli Conrad PA-C  [facility]   [phone]      Adendium: 1500 12/15/2021: hg remains stable 11.0 at 1404.

## 2021-12-15 NOTE — CONSULTS
"Care Management Initial Consult    General Information  Assessment completed with: Patient,Spouse or significant other, Yovani and wife Logan  Type of CM/SW Visit: Initial Assessment    Primary Care Provider verified and updated as needed: Yes   Readmission within the last 30 days: no previous admission in last 30 days      Reason for Consult: discharge planning  Advance Care Planning: Advance Care Planning Reviewed: other (comment) (\"don't have one\")          Communication Assessment  Patient's communication style: spoken language (English or Bilingual)    Hearing Difficulty or Deaf: no   Wear Glasses or Blind: no    Cognitive  Cognitive/Neuro/Behavioral: WDL                      Living Environment:   People in home: spouse  Logan  Current living Arrangements: house (\"multi level house\")      Able to return to prior arrangements: yes       Family/Social Support:  Care provided by: self  Provides care for: no one  Marital Status:   Wife  Logan       Description of Support System: Supportive,Involved    Support Assessment: Adequate family and caregiver support,Adequate social supports,Patient communicates needs well met    Current Resources:   Patient receiving home care services: No     Community Resources: None  Equipment currently used at home: walker, rolling (\"I only occasionally use the walker\")  Supplies currently used at home: Oxygen Tubing/Supplies,Other (\"CPAP, glasses\")    Employment/Financial:  Employment Status: retired     Employment/ Comments: \"no  benefits\"  Financial Concerns:     Referral to Financial Counselor: No       Lifestyle & Psychosocial Needs:  Social Determinants of Health     Tobacco Use: Low Risk      Smoking Tobacco Use: Never Smoker     Smokeless Tobacco Use: Never Used   Alcohol Use: Not on file   Financial Resource Strain: Not on file   Food Insecurity: Not on file   Transportation Needs: Not on file   Physical Activity: Not on file   Stress: Not on file   Social " "Connections: Not on file   Intimate Partner Violence: Not on file   Depression: Not on file   Housing Stability: Not on file       Functional Status:  Prior to admission patient needed assistance:   Dependent ADLs:: Ambulation-walker,Independent (\"walker occasionally\")  Dependent IADLs:: Independent       Mental Health Status:          Chemical Dependency Status:                Values/Beliefs:  Spiritual, Cultural Beliefs, Anabaptist Practices, Values that affect care:                 Additional Information:  Yovani lives in a house with his wife. He is independent with ADLs at baseline.    He \"has a walker that he can use at home. Only uses it occasionally\". Also uses a home CPAP.    Likely no discharge needs at this time.    Wife to transport at discharge.    Emily Santizo RN      "

## 2021-12-15 NOTE — ED PROVIDER NOTES
EMERGENCY DEPARTMENT ENCOUNTER      NAME: Yovani Bauman  AGE: 71 year old male  YOB: 1950  MRN: [id number]  EVALUATION DATE & TIME: 12/15/2021  3:16 AM    PCP: Isidro Espinosa    ED PROVIDER: Kyara Alexander M.D.      Chief Complaint   Patient presents with     Flank Pain         FINAL IMPRESSION:  1. Perinephric hematoma    2. Renal mass    3. Flank pain        MEDICAL DECISION MAKING:    Pertinent Labs & Imaging studies reviewed. (See chart for details)  ED Course as of 12/15/21 0651   Wed Dec 15, 2021   0341 Afebrile.  Vital signs here with some tachycardia, tachypnea.  Likely secondary to discomfort.  Patient coming in for evaluation of left-sided flank pain.  Started earlier this evening.  Initially in back and now radiating around to left flank and left abdomen.  Consistent, sharp, stabbing.  Associated with nausea.  Has been diaphoretic secondary to the pain.  No pain in his back or chest.  Does have prostate cancer, currently undergoing radiation therapy.  No history of any mets to bones.  Had some cancer noted in lymph nodes.  We will keep close eye on his heart rate and respiration rate.  At this point I believe this is secondary to the pain in his back.  Pain that he is describing could be secondary to urinary issues versus hydronephrosis versus Alan versus kidney stone.  However with flank pain and history of prostate cancer also difficult to rule out PE at this junction.  Will initially start with dry CT scan abdomen pelvis, check labs, CRP, urinalysis.  If no evidence of stone will proceed with PE run to rule out.Physical exam for patient here appears moderately uncomfortable, diaphoretic in room.  Has left-sided CVA tenderness as well as some mild left flank tenderness to palpation.  Otherwise palpation of the abdomen without any significant increased pain.  Lungs are clear to auscultation bilaterally, heart regular tachycardic but with regular rhythm.  Remainder unremarkable.    0417 Patient's labs coming back with lactic acid elevated at 2.8.  His CRP is elevated here as well as a mild elevation of his white blood cell count.  Will add on blood cultures, will give a dose of Rocephin here.  Still awaiting remainder of his imaging work-up.   0418 Patient's creatinine here 1.64.  Previous creatinine comparison from [facility] [employer] [facility] on 2/10/2021 was 1.33.   0639 Patient's try CT back here with evidence of large subcapsular hematoma and bleeding around his left kidney, possible kidney mass noted as well.  Patient updated as to plan.  Spoke with radiologist, they are requesting CTA to evaluate for any active bleeding.  I did call and speak with IR who stated to call them back if CTA showed any active bleeding.  Patient and patient's wife Updated as to plan.  Plan on CTA, admit and possible intervention.       Signed out to dayshift pending follow-up CTA, admission.    Critical care: 0 minutes excluding separately billable procedures.  Includes bedside management, time reviewing test results, review of records, discussing the case with staff, documenting the medical record and time spent with family members (or surrogate decision makers) discussing specific treatment issues.          ED COURSE:  3:28 AM I met with the patient for the initial interview and physical examination. Discussed plan for treatment and workup in the ED.   4:19 AM I rechecked the patient, who is feeling much better.  5:03 AM I discussed with radiology, and who shared of bleeding from left side kidney.   5:03 AM I updated the patient.  5:15 AM I spoke with IR.    The importance of close follow up was discussed. We reviewed warning signs and symptoms, and I instructed Mr. Bauman to return to the emergency department immediately if he develops any new or worsening symptoms. I provided additional verbal discharge instructions. Mr. Bauman expressed understanding and agreement with this plan of care, his questions were  "answered, and he was discharged in stable condition.     PPE: Provider wore gloves, N95 mask, eye protection, surgical cap, and paper mask.  MEDICATIONS GIVEN IN THE EMERGENCY:  Medications   HYDROmorphone (DILAUDID) injection 0.5 mg (0.5 mg Intravenous Given 12/15/21 0500)   HYDROmorphone (DILAUDID) injection 1 mg (1 mg Intravenous Given 12/15/21 0349)   ondansetron (ZOFRAN) injection 4 mg (4 mg Intravenous Given 12/15/21 0346)   0.9% sodium chloride BOLUS (0 mLs Intravenous Stopped 12/15/21 0428)   0.9% sodium chloride BOLUS (1,000 mLs Intravenous New Bag 12/15/21 0539)   cefTRIAXone (ROCEPHIN) 1 g vial to attach to  mL bag for ADULTS or NS 50 mL bag for PEDS (0 g Intravenous Stopped 12/15/21 0648)   iopamidol (ISOVUE-370) solution 100 mL (100 mLs Intravenous Given 12/15/21 0635)       NEW PRESCRIPTIONS STARTED AT TODAY'S ER VISIT:  New Prescriptions    No medications on file          =================================================================    HPI    Patient information was obtained from: Patient    Use of : N/A      Yovani Bauman is a 71 year old male with pertinent medical history of prostate cancer, morbid obesity, atrial flutter, and hypertension who presents to the ED via private car for evaluation of flank pain.    Around 2300 tonight (12/14), the patient began to endorse left sided flank pain. It started has back pain, and started to wrap around and radiate to his left side. Currently, it is immobilizing him from moving his legs. The pain is exacerbated by movement and touch. It is described as a \"stabbing\" pain. He endorses nausea, but not vomiting. Prior to the onset of the pain, he had 2X episodes of diarrhea today. He states that he is cold and sweating, but is unsure if this is due to a fever or the pain. The patient denies problems urinating, hematuria, history of kidney stones, or any other complaints at this time.    He denies allergies to medications.  Currently, he " is receiving radiation for his cancer treatment. His cancer started in his prostate, and after removing it, cancer was found in his lymph nodes. He denies cancer in his spine.     REVIEW OF SYSTEMS   Review of Systems   Constitutional: Positive for diaphoresis.        Positive for being cold and sweating   Gastrointestinal: Positive for diarrhea (2X) and nausea. Negative for vomiting.   Genitourinary: Positive for flank pain (left, immobilizing his legs). Negative for difficulty urinating.   Musculoskeletal: Positive for back pain (left side).   All other systems reviewed and are negative.        PAST MEDICAL HISTORY:  Past Medical History:   Diagnosis Date     ASCVD (arteriosclerotic cardiovascular disease)      Atrial flutter (H)      Depression      Edema      GERD (gastroesophageal reflux disease)      High triglycerides      HTN (hypertension)      Low vitamin B12 level      Metabolic syndrome      TRENTON on CPAP     CPAP     Prostate CA (H)        PAST SURGICAL HISTORY:  Past Surgical History:   Procedure Laterality Date     CHOLECYSTECTOMY       CRYOABLATION  07/2019     HYDROCELECTOMY SCROTAL       KNEE SURGERY       AR SURG DIAGNOSTIC EXAM, ANORECTAL N/A 12/26/2019    Procedure: EXAM UNDER ANESTHESIA WITH EXCISION OF ANAL SKIN TAG;  Surgeon: Lenny Ramsey MD;  Location: [facility];  Service: General     PROSTATE SURGERY       VASECTOMY         CURRENT MEDICATIONS:      Current Facility-Administered Medications:      HYDROmorphone (DILAUDID) injection 0.5 mg, 0.5 mg, Intravenous, Q2H PRN, Yelena Alexander MD, 0.5 mg at 12/15/21 0500    Current Outpatient Medications:      atorvastatin (LIPITOR) 20 MG tablet, [ATORVASTATIN (LIPITOR) 20 MG TABLET] Take 20 mg by mouth daily., Disp: , Rfl:      buPROPion (WELLBUTRIN XL) 150 MG 24 hr tablet, [BUPROPION (WELLBUTRIN XL) 150 MG 24 HR TABLET] Take 1 tablet (150 mg total) by mouth daily., Disp: 30 tablet, Rfl: 3     buPROPion (WELLBUTRIN XL) 300 MG 24 hr tablet,  [BUPROPION (WELLBUTRIN XL) 300 MG 24 HR TABLET] Take 1 tablet (300 mg total) by mouth daily., Disp: 30 tablet, Rfl: 3     CHOLECALCIFEROL, VITAMIN D3, (VITAMIN D3 ORAL), [CHOLECALCIFEROL, VITAMIN D3, (VITAMIN D3 ORAL)] Take 2,000 Units by mouth daily., Disp: , Rfl:      diltiazem (CARDIZEM CD) 180 MG 24 hr capsule, [DILTIAZEM (CARDIZEM CD) 180 MG 24 HR CAPSULE] Take 180 mg by mouth daily., Disp: , Rfl:      losartan-hydrochlorothiazide (HYZAAR) 100-25 mg per tablet, [LOSARTAN-HYDROCHLOROTHIAZIDE (HYZAAR) 100-25 MG PER TABLET] Take 0.5 tablets by mouth daily., Disp: , Rfl: 0     oxyCODONE-acetaminophen (PERCOCET/ENDOCET) 5-325 mg per tablet, [OXYCODONE-ACETAMINOPHEN (PERCOCET/ENDOCET) 5-325 MG PER TABLET] Take 1-2 tablets by mouth every 4 (four) hours as needed for pain (moderate pain)., Disp: 20 tablet, Rfl: 0     rivaroxaban 20 mg Tab, [RIVAROXABAN 20 MG TAB] Take 1 tablet (20 mg total) by mouth daily with supper., Disp: 90 tablet, Rfl: 1    ALLERGIES:  Allergies   Allergen Reactions     Other Environmental Allergy Unknown     Cockroaches, dust mites....the patient had allergy testing done and showed he might me allergic.  Never has had reaction     Shellfish Containing Products [Shellfish-Derived Products] Unknown     Pt did allergy testing and showed shellfish, pt has never had a reaction       FAMILY HISTORY:  Family History   Problem Relation Age of Onset     Coronary Artery Disease Mother      Coronary Artery Disease Father        SOCIAL HISTORY:   Social History     Socioeconomic History     Marital status:      Spouse name: Not on file     Number of children: Not on file     Years of education: Not on file     Highest education level: Not on file   Occupational History     Not on file   Tobacco Use     Smoking status: Never Smoker     Smokeless tobacco: Never Used   Substance and Sexual Activity     Alcohol use: Yes     Comment: Alcoholic Drinks/day: occassional     Drug use: No     Sexual activity:  "Not on file   Other Topics Concern     Not on file   Social History Narrative     Not on file     Social Determinants of Health     Financial Resource Strain: Not on file   Food Insecurity: Not on file   Transportation Needs: Not on file   Physical Activity: Not on file   Stress: Not on file   Social Connections: Not on file   Intimate Partner Violence: Not on file   Housing Stability: Not on file       PHYSICAL EXAM:    Vitals: BP (!) 197/113   Pulse 103   Temp 97.7  F (36.5  C) (Oral)   Resp 20   Ht 1.778 m (5' 10\")   Wt (!) 156.5 kg (345 lb)   SpO2 95%   BMI 49.50 kg/m     General:. Alert and interactive, moderately uncomfortable appearing, diaphoretic in room.  HENT: Oropharynx without erythema or exudates. MMM.  TMs clear bilaterally.  Eyes: Pupils mid-sized and equally reactive.   Neck: Full AROM.  No midline tenderness to palpation.  Cardiovascular: Regular rhythm, tachycardic. Peripheral pulses 2+ bilaterally.  Chest/Pulmonary: Normal work of breathing. Lung sounds clear and equal throughout, no wheezes or crackles. No chest wall tenderness or deformities.  Abdomen: Soft, nondistended. Nontender without guarding or rebound. Some mild left flank tenderness to palpation. Palpation of abdomen without any significant increased pin.  Back/Spine: No midline tenderness.  Has left-sided CVA tenderness.  Extremities: Normal ROM of all major joints. No lower extremity edema.   Skin: Warm and dry. Normal skin color.   Neuro: Speech clear. CNs grossly intact. Moves all extremities appropriately. Strength and sensation grossly intact to all extremities.   Psych: Normal affect/mood, cooperative, memory appropriate.     LAB:  All pertinent labs reviewed and interpreted.  Labs Ordered and Resulted from Time of ED Arrival to Time of ED Departure   COMPREHENSIVE METABOLIC PANEL - Abnormal       Result Value    Sodium 137      Potassium 3.6      Chloride 103      Carbon Dioxide (CO2) 20 (*)     Anion Gap 14      Urea " Nitrogen 21      Creatinine 1.65 (*)     Calcium 9.6      Glucose 234 (*)     Alkaline Phosphatase 75      AST 15      ALT 16      Protein Total 6.5      Albumin 3.4 (*)     Bilirubin Total 0.5      GFR Estimate 42 (*)    CRP INFLAMMATION - Abnormal    CRP 2.0 (*)    LACTIC ACID WHOLE BLOOD - Abnormal    Lactic Acid 2.8 (*)    CBC WITH PLATELETS AND DIFFERENTIAL - Abnormal    WBC Count 14.5 (*)     RBC Count 4.74      Hemoglobin 13.3      Hematocrit 40.2      MCV 85      MCH 28.1      MCHC 33.1      RDW 14.0      Platelet Count 195      % Neutrophils 90      % Lymphocytes 3      % Monocytes 5      % Eosinophils 0      % Basophils 1      % Immature Granulocytes 1      NRBCs per 100 WBC 0      Absolute Neutrophils 13.2 (*)     Absolute Lymphocytes 0.5 (*)     Absolute Monocytes 0.7      Absolute Eosinophils 0.1      Absolute Basophils 0.1      Absolute Immature Granulocytes 0.1      Absolute NRBCs 0.0     ROUTINE UA WITH MICROSCOPIC REFLEX TO CULTURE   TYPE AND SCREEN, ADULT    ABO/RH(D) AB POS      Antibody Screen Negative      SPECIMEN EXPIRATION DATE [id number]     BLOOD CULTURE   BLOOD CULTURE   ABO/RH TYPE AND SCREEN       RADIOLOGY:  Abd/pelvis CT no contrast - Stone Protocol   Final Result   IMPRESSION:    1.  There is a large acute left perinephric hematoma. There is also likely a significant subcapsular hematoma involving the left kidney as well. Evaluation for active bleeding cannot be made on a noncontrast study. CTA could be obtained in further    evaluation if indicated.      2.  There is a large heterogeneous exophytic 8 cm mass arising from the left kidney which could potentially be the source of the acute bleed although not definitive. This lesion is concerning for potential malignancy. Follow-up dedicated MRI of the    kidneys recommended for further evaluation when clinically appropriate.      3.  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      4.  Postop prostatectomy. No adenopathy.         Findings called to Dr. Alexander on 12/15/2021 at 5:00 AM.         CTA Abdomen Pelvis with Contrast    (Results Pending)       EKG:  See MDM  I have independently reviewed and interpreted the EKG(s) documented above.           I, Valentine Bobo, am serving as a scribe to document services personally performed by Dr. Kyara Alexander  based on my observation and the provider's statements to me. I, Kyara Alexander MD attest that Valentine Bobo is acting in a scribe capacity, has observed my performance of the services and has documented them in accordance with my direction.      Kyara Alexander M.D.  Emergency Medicine  [facility] EMERGENCY DEPARTMENT  [address] [place] [id number]  [phone]  Dept: [phone]       Yelena Alexander MD  12/16/21 0027

## 2021-12-15 NOTE — ED TRIAGE NOTES
Pt here with left flank pain that started around 2000 tonight. He denies any problems urinating or blood in the urine. Pt is in 3rd week of radiation for prostate cancer. Pain starts in the left flank radiates around to the front abdomen. Pt is diaphoretic in triage, afib new onset recently. Had been in control from years ago.

## 2021-12-15 NOTE — PLAN OF CARE
Problem: Adult Inpatient Plan of Care  Goal: Absence of Hospital-Acquired Illness or Injury  Outcome: Improving  Goal: Optimal Comfort and Wellbeing  Outcome: Improving  Goal: Readiness for Transition of Care  Outcome: Improving   Pt a/o x4, pleasant, cooperative. Wife present at bedside. Pt reported chest pain 3/10 at approx 1150. Resident md notified--only new order was diltiazem, which has been given. Pt reported chest pain was gone approx 1 1/2 hr later. C/o left flank pain rated 7-8/10 w/ movement & 6-7/10 at rest. Pt received a dose at 1220 & 1351. Pt reported some relief. Said still is quite painful w/ movement.tele continues tracing A-fib. Hr noted to go up to 150 w/ repositioning from side of bed to lying in bed. Then back down to 120-130's. Report given to Madison FERRO RN on P1. Pt transferred to room 126 at this time via stretcher w/ belongings, wife present. Reminded pt to use call light for needs. Staff will continue to monitor pt.

## 2021-12-15 NOTE — PHARMACY-ADMISSION MEDICATION HISTORY
Pharmacy Note - Admission Medication History    Pertinent Provider Information: N/A     ______________________________________________________________________    Prior To Admission (PTA) med list completed and updated in EMR.       PTA Med List   Medication Sig Last Dose     atorvastatin (LIPITOR) 20 MG tablet Take 20 mg by mouth At Bedtime  12/14/2021 at PM     buPROPion (WELLBUTRIN XL) 300 MG 24 hr tablet [BUPROPION (WELLBUTRIN XL) 300 MG 24 HR TABLET] Take 1 tablet (300 mg total) by mouth daily. 12/14/2021 at AM     diltiazem (CARDIZEM CD) 180 MG 24 hr capsule [DILTIAZEM (CARDIZEM CD) 180 MG 24 HR CAPSULE] Take 180 mg by mouth daily. 12/14/2021 at AM     leuprolide, 3 Month, (ELIGARD) 22.5 MG Inject 22.5 mg Subcutaneous every 3 months Unknown at Unknown time     losartan-hydrochlorothiazide (HYZAAR) 100-25 MG tablet Take 1 tablet by mouth daily 12/14/2021 at AM     rivaroxaban 20 mg Tab [RIVAROXABAN 20 MG TAB] Take 1 tablet (20 mg total) by mouth daily with supper. 12/14/2021 at PM     venlafaxine (EFFEXOR-XR) 75 MG 24 hr capsule Take 75 mg by mouth daily 12/14/2021 at AM       Information source(s): Patient and CareEverywhere/[facility]  Method of interview communication: in-person    Summary of Changes to PTA Med List  New: Effexor,, Eligard  Discontinued: Percocet, D3, Wellbutrin 150 mg  Changed: losartan-hydrochlorothiazide dose    Patient was asked about OTC/herbal products specifically.  PTA med list reflects this.    In the past week, patient estimated taking medication this percent of the time:  greater than 90%.    Allergies were reviewed, assessed, and updated with the patient.      Patient does not use any multi-dose medications prior to admission.    The information provided in this note is only as accurate as the sources available at the time of the update(s).    Thank you for the opportunity to participate in the care of this patient.    Ray Crowell, [facility]  12/15/2021 8:31 AM

## 2021-12-15 NOTE — H&P
[facility]    History and Physical - Hospitalist Service       Date of Admission:  12/15/2021    Assessment & Plan      Yovani Bauman is a 71 year old male admitted on 12/15/2021.     # Large left subcapsular left perinephric hematoma with extension into the retrorenal space and small focus of venous  active visitation, on CTA on 12/15/2021 at 6:17 AM.  Patient anticoagulated with Xarelto for A. fib.  -Last dose of Xarelto 12/14 at 10 PM.  -S/p anticoagulation reversal with dose of Kcentra, as D/W Pharm.D. Rita  -Stat urology consult, recommended conservative treatment with bedrest, serial hemoglobin, holding and reversing anticoagulation; currently no indications for IR embolization, as venous slow bleeding expected to subside, and limited by renal capsule, and it could complicate future nephrectomy if indicated.  - IR ablation for ongoing bleed/hemodynamic instability.  -Monitor renal function  -N.p.o. for today, till hemoglobin stabilized, IVF    #Acute blood loss anemia.  Due to #1.  Hemoglobin 13.3 on admission, down to 11 within 5 hours.  Patient hemodynamically stable.    Serial hemoglobin every 6 hours.    Transfuse to keep hemoglobin above 8.    Blood transfusion consent was obtained.    #Left kidney mass, 8 x 6 cm, concern for RCC.  -Urology consulted, patient to follow-up closely with Dr. Cazares.    #Bilateral renal simple cysts, without signs of infection or hemorrhage, per CT.    #History of prostate cancer, s/p prostatectomy.  Early castrate resistant recurrent prostate cancer. He is on androgen depravation therapy (next Eligard due after 12/31/2021), per urology.    #Atrial fibrillation, chronic.  Rate controlled with Cardizem.  Anticoagulated with Xarelto.  Last dose of Xarelto last night at 10 PM.  Patient shared that his cardiology have plans for cardioversion within the next 2-3 weeks.  Continue home dose of Cardizem  mg.  Additional 30 mg of short acting  Cardizem requested to 2 PM, for heart rate in 1 teens, SBP 170s.  Telemetry.    HTN, elevated BP, likely due to pain.  Continue PTA Cardizem, losartan at 1/2 home dose, holding HCTZ.    MDD, stable.  Continue PTA Wellbutrin, Effexor.    HLD-on Lipitor.       Diet:  NPO  DVT Prophylaxis: Pneumatic Compression Devices  Bell Catheter: Not present  Central Lines: None  Code Status:   Full    Clinically Significant Risk Factors Present on Admission             # Coagulation Defect: home medication list includes an anticoagulant medication       Disposition Plan   Expected Discharge:    Anticipated discharge location:  Awaiting care coordination huddle  Delays:      Anticipate hospitalization for more than 2 midnights.     The patient's care was discussed with the Bedside Nurse, Care Coordinator/ and Patient.    Eliza Keen MD  [facility]  Securely message with the Vocera Web Console (learn more here)  Text page via [facility] Paging/Directory  ___________________________________________________________________    Chief Complaint   Left flank abdominal pain.    History is obtained from the patient, electronic health record and emergency department physician    History of Present Illness   Yovani Bauman is a 71 year old male with pertinent medical history of prostate cancer, morbid obesity, atrial flutter, and hypertension who presents to the ED via private car for evaluation of flank pain.  Sudden onset of pain on 12/14 at 2300, and left flank, sharp, radiating to left lower back and abdomen.  Patient is consistent, stabbing, associated with nausea and diaphoresis.  Pain was exacerbated by moving legs, walking, touch.  Prior to the onset of the pain, he had 2X episodes of diarrhea today.   He denies dysuria, hematuria, chest pain, cough, fever, cardiac operations, headache, focal weakness.  CT abdomen/pelvis without IV contrast ruled out nephrolithiasis, showed left kidney  subcapsular large hematoma.  CTA abdomen/pelvis showed left subcapsular and perinephric hematoma with extension into the retroperitoneal space, with small focus of active extravasation, 8 x 6 exophytic masslike lesion in the left kidney, concerning for solid renal mass versus hemorrhagic cyst, bilateral simple cysts, s/p prostatectomy.  At 8:27 AM  ED provider spoke to Dr. Kauffman, on-call for IR-recommended emergent retention, urology consult.    Review of Systems    The 10 point Review of Systems is negative other than noted in the HPI or here.     Past Medical History    I have reviewed this patient's medical history and updated it with pertinent information if needed.   Past Medical History:   Diagnosis Date     ASCVD (arteriosclerotic cardiovascular disease)      Atrial flutter (H)      Depression      Edema      GERD (gastroesophageal reflux disease)      High triglycerides      HTN (hypertension)      Low vitamin B12 level      Metabolic syndrome      TRENTON on CPAP     CPAP     Prostate CA (H)        Past Surgical History   I have reviewed this patient's surgical history and updated it with pertinent information if needed.  Past Surgical History:   Procedure Laterality Date     CHOLECYSTECTOMY       CRYOABLATION  07/2019     HYDROCELECTOMY SCROTAL       KNEE SURGERY       CA SURG DIAGNOSTIC EXAM, ANORECTAL N/A 12/26/2019    Procedure: EXAM UNDER ANESTHESIA WITH EXCISION OF ANAL SKIN TAG;  Surgeon: Lenny Ramsey MD;  Location: [facility] [place];  Service: General     PROSTATE SURGERY       VASECTOMY         Social History   I have reviewed this patient's social history and updated it with pertinent information if needed.  Social History     Tobacco Use     Smoking status: Never Smoker     Smokeless tobacco: Never Used   Substance Use Topics     Alcohol use: Yes     Comment: Alcoholic Drinks/day: occassional     Drug use: No       Family History   I have reviewed this patient's family history and updated it  with pertinent information if needed.  Family History   Problem Relation Age of Onset     Coronary Artery Disease Mother      Coronary Artery Disease Father        Prior to Admission Medications   Prior to Admission Medications   Prescriptions Last Dose Informant Patient Reported? Taking?   atorvastatin (LIPITOR) 20 MG tablet 12/14/2021 at PM  Yes Yes   Sig: Take 20 mg by mouth At Bedtime    buPROPion (WELLBUTRIN XL) 300 MG 24 hr tablet 12/14/2021 at AM  No Yes   Sig: [BUPROPION (WELLBUTRIN XL) 300 MG 24 HR TABLET] Take 1 tablet (300 mg total) by mouth daily.   diltiazem (CARDIZEM CD) 180 MG 24 hr capsule 12/14/2021 at AM  Yes Yes   Sig: [DILTIAZEM (CARDIZEM CD) 180 MG 24 HR CAPSULE] Take 180 mg by mouth daily.   leuprolide, 3 Month, (ELIGARD) 22.5 MG Unknown at Unknown time  Yes Yes   Sig: Inject 22.5 mg Subcutaneous every 3 months   losartan-hydrochlorothiazide (HYZAAR) 100-25 MG tablet 12/14/2021 at AM  Yes Yes   Sig: Take 1 tablet by mouth daily   rivaroxaban 20 mg Tab 12/14/2021 at PM  No Yes   Sig: [RIVAROXABAN 20 MG TAB] Take 1 tablet (20 mg total) by mouth daily with supper.   venlafaxine (EFFEXOR-XR) 75 MG 24 hr capsule 12/14/2021 at AM  Yes Yes   Sig: Take 75 mg by mouth daily      Facility-Administered Medications: None     Allergies   Allergies   Allergen Reactions     Other Environmental Allergy Unknown     Cockroaches, dust mites....the patient had allergy testing done and showed he might me allergic.  Never has had reaction     Shellfish Containing Products [Shellfish-Derived Products] Unknown     Pt did allergy testing and showed shellfish, pt has never had a reaction       Physical Exam   Vital Signs: Temp: 97.7  F (36.5  C) Temp src: Oral BP: (!) 172/78 Pulse: 112   Resp: 20 SpO2: 94 % O2 Device: None (Room air)    Weight: 345 lbs 0 oz    General: Alert and oriented x 3. Not in obvious distress.  Morbidly obese.  HEENT: NC, AT. Neck- supple, No JVP elevation, lymphadenopathy or thyromegaly.  Trachea-central.  Chest: Clear to auscultation bilaterally.  Heart: S1S2 regular. No M/R/G.  Abdomen: Soft.,  Obese, tender in left flank, no rebound tenderness.  Hypoactive bowel sounds.  Back: No spine tenderness. No CVA tenderness.  Extremities: No leg swelling. Peripheral pulses 2+ bilaterally.  Neuro: Cranial nerves 1-12 grossly normal. No focal neurological deficit    Data   Data reviewed today: I reviewed all medications, new labs and imaging results over the last 24 hours. I personally reviewed    Recent Labs   Lab 12/15/21  0911 12/15/21  0307   WBC 14.4* 14.5*   HGB 11.2* 13.3   MCV 86 85    195   INR  --  1.92*    137   POTASSIUM 3.9 3.6   CHLORIDE 105 103   CO2 23 20*   BUN 22 21   CR 1.60* 1.65*   ANIONGAP 12 14   LARRY 8.9 9.6   * 234*   ALBUMIN 3.1* 3.4*   PROTTOTAL 6.0 6.5   BILITOTAL 0.4 0.5   ALKPHOS 69 75   ALT 16 16   AST 11 15     Recent Results (from the past 24 hour(s))   Abd/pelvis CT no contrast - Stone Protocol    Narrative    EXAM: CT ABDOMEN AND PELVIS WITHOUT CONTRAST  LOCATION: [facility]  DATE/TIME: 12/15/2021, 4:15 AM    INDICATION: Flank pain, kidney stone suspected.  COMPARISON: 09/16/2016.  TECHNIQUE: CT scan of the abdomen and pelvis was performed without IV contrast. Multiplanar reformats were obtained. Dose reduction techniques were used.  CONTRAST: None.    FINDINGS:   LOWER CHEST: There is a subpleural linear scar and/or atelectasis in the lung bases. Trace left pleural effusion. Coronary artery calcification.    HEPATOBILIARY: Liver is negative. Gallbladder is absent. No biliary dilatation.    PANCREAS: Normal.    SPLEEN: Normal.    ADRENAL GLANDS: Normal.    KIDNEYS/BLADDER: There is a heterogeneous exophytic mass arising off the left kidney posteromedially measuring up to 8 cm in diameter. This is indeterminate but concerning for potential malignancy. Large amount of acute blood products about the left   kidney in the perinephric  space compatible with acute hematoma and may be related to the above-described mass but is indeterminate. Active bleeding cannot be excluded on a noncontrast study. Clinical correlation. There is probable at least a partial   component of subcapsular hemorrhage about the left kidney as well. Large benign cyst upper pole right kidney measures up to 12 cm in diameter. Additional smaller cyst also noted in the right kidney. Heterogeneous fat containing lesion in the right kidney   anteriorly is unchanged from 2016 and compatible with a benign angiomyolipoma. No urinary calculi or hydronephrosis. Bladder is unremarkable.    BOWEL: Normal.    LYMPH NODES: Normal.    VASCULATURE: Unremarkable.    PELVIC ORGANS: Prostatectomy.    MUSCULOSKELETAL: Moderate to marked degenerative and hypertrophic changes thoracic and lumbar spine.      Impression    IMPRESSION:   1.  There is a large acute left perinephric hematoma. There is also likely a significant subcapsular hematoma involving the left kidney as well. Evaluation for active bleeding cannot be made on a noncontrast study. CTA could be obtained in further   evaluation if indicated.    2.  There is a large heterogeneous exophytic 8 cm mass arising from the left kidney which could potentially be the source of the acute bleed although not definitive. This lesion is concerning for potential malignancy. Follow-up dedicated MRI of the   kidneys recommended for further evaluation when clinically appropriate.    3.  Large benign 12 cm cyst upper pole right kidney as well as a stable benign angiomyolipoma in the right kidney anteriorly.    4.  Postop prostatectomy. No adenopathy.      Findings called to Dr. Alexander on 12/15/2021 at 5:00 AM.     CTA Abdomen Pelvis with Contrast    Narrative    EXAM: CTA ABDOMEN PELVIS WITH CONTRAST  LOCATION: [facility]  DATE/TIME: 12/15/2021 6:17 AM    INDICATION: Left-sided renal bruising.  COMPARISON: None.  TECHNIQUE: CT  angiogram abdomen pelvis during arterial phase of injection of IV contrast. 2D and 3D MIP reconstructions were performed by the CT technologist. Dose reduction techniques were used.  CONTRAST: 12/15/2021    FINDINGS:  ANGIOGRAM ABDOMEN/PELVIS: The aorta is widely patent and normal in caliber and contour. Patent celiac, superior mesenteric, renal, and inferior mesenteric arteries. On delayed imaging a focus of active extravasation is seen in the left perinephric   hematoma (10, 108). No active arterial extravasation is noted.    LOWER CHEST: Mild coronary artery disease. Small left pleural effusion.    HEPATOBILIARY: Status post cholecystectomy.    PANCREAS: Normal.    SPLEEN: Normal.    ADRENAL GLANDS: Normal.    KIDNEYS/BLADDER: A large left subcapsular perinephric hematoma is seen with extension into the retrorenal space. Additionally seen is an 8.3 x 6 cm exophytic mass arising from the left kidney. 3.9 cm left simple renal cyst No hydroureteronephrosis.    9.8 and 2.7 cm simple right renal cysts. No right-sided hydroureteronephrosis.    BOWEL: No bowel obstruction, colitis, diverticulitis, or appendicitis.    LYMPH NODES: Normal.    PELVIC ORGANS: Status post prostatectomy.    MUSCULOSKELETAL: Moderate multilevel discogenic degenerative change.      Impression    IMPRESSION:  1.  Large left subcapsular left perinephric hematoma with extension into the retrorenal space. Small focus of venous active extravasation (10, 107).  2.  8.3 x 6 cm exophytic masslike lesion arising from the medial left kidney concerning for a solid renal mass, though hemorrhagic cyst may be considered. Recommend consultation with urology to exclude renal cell carcinoma.  3.  Bilateral simple renal cysts, no follow-up required.  4.  Status post cholecystectomy.  5.  Small left pleural effusion.  6.  Mild atherosclerotic vascular disease and coronary artery disease.  7.  Status post prostatectomy.

## 2021-12-16 ENCOUNTER — APPOINTMENT (OUTPATIENT)
Dept: CT IMAGING | Facility: HOSPITAL | Age: 71
End: 2021-12-16
Attending: UROLOGY
Payer: COMMERCIAL

## 2021-12-16 LAB
ALBUMIN SERPL-MCNC: 3 G/DL (ref 3.5–5)
ALP SERPL-CCNC: 61 U/L (ref 45–120)
ALT SERPL W P-5'-P-CCNC: 14 U/L (ref 0–45)
ANION GAP SERPL CALCULATED.3IONS-SCNC: 9 MMOL/L (ref 5–18)
AST SERPL W P-5'-P-CCNC: 11 U/L (ref 0–40)
ATRIAL RATE - MUSE: 227 BPM
BASOPHILS # BLD AUTO: 0 10E3/UL (ref 0–0.2)
BASOPHILS NFR BLD AUTO: 0 %
BILIRUB SERPL-MCNC: 0.3 MG/DL (ref 0–1)
BLD PROD TYP BPU: NORMAL
BLOOD COMPONENT TYPE: NORMAL
BUN SERPL-MCNC: 27 MG/DL (ref 8–22)
CALCIUM SERPL-MCNC: 9.4 MG/DL (ref 8.5–10.5)
CHLORIDE BLD-SCNC: 106 MMOL/L (ref 98–107)
CO2 SERPL-SCNC: 27 MMOL/L (ref 22–31)
CODING SYSTEM: NORMAL
CREAT SERPL-MCNC: 1.7 MG/DL (ref 0.7–1.3)
CROSSMATCH: NORMAL
DIASTOLIC BLOOD PRESSURE - MUSE: 86 MMHG
EOSINOPHIL # BLD AUTO: 0 10E3/UL (ref 0–0.7)
EOSINOPHIL NFR BLD AUTO: 0 %
ERYTHROCYTE [DISTWIDTH] IN BLOOD BY AUTOMATED COUNT: 14.5 % (ref 10–15)
ERYTHROCYTE [DISTWIDTH] IN BLOOD BY AUTOMATED COUNT: 14.6 % (ref 10–15)
GFR SERPL CREATININE-BSD FRML MDRD: 40 ML/MIN/1.73M2
GLUCOSE BLD-MCNC: 139 MG/DL (ref 70–125)
HBA1C MFR BLD: 5.9 %
HCT VFR BLD AUTO: 28.6 % (ref 40–53)
HCT VFR BLD AUTO: 29.8 % (ref 40–53)
HGB BLD-MCNC: 8.3 G/DL (ref 13.3–17.7)
HGB BLD-MCNC: 9.1 G/DL (ref 13.3–17.7)
HGB BLD-MCNC: 9.1 G/DL (ref 13.3–17.7)
HGB BLD-MCNC: 9.5 G/DL (ref 13.3–17.7)
HGB BLD-MCNC: 9.5 G/DL (ref 13.3–17.7)
IMM GRANULOCYTES # BLD: 0.1 10E3/UL
IMM GRANULOCYTES NFR BLD: 1 %
INR PPP: 1.3 (ref 0.85–1.15)
INTERPRETATION ECG - MUSE: NORMAL
ISSUE DATE AND TIME: NORMAL
LYMPHOCYTES # BLD AUTO: 0.5 10E3/UL (ref 0.8–5.3)
LYMPHOCYTES NFR BLD AUTO: 4 %
MAGNESIUM SERPL-MCNC: 1.9 MG/DL (ref 1.8–2.6)
MCH RBC QN AUTO: 27.9 PG (ref 26.5–33)
MCH RBC QN AUTO: 28.1 PG (ref 26.5–33)
MCHC RBC AUTO-ENTMCNC: 31.8 G/DL (ref 31.5–36.5)
MCHC RBC AUTO-ENTMCNC: 31.9 G/DL (ref 31.5–36.5)
MCV RBC AUTO: 87 FL (ref 78–100)
MCV RBC AUTO: 88 FL (ref 78–100)
MONOCYTES # BLD AUTO: 1.1 10E3/UL (ref 0–1.3)
MONOCYTES NFR BLD AUTO: 8 %
NEUTROPHILS # BLD AUTO: 11.6 10E3/UL (ref 1.6–8.3)
NEUTROPHILS NFR BLD AUTO: 87 %
NRBC # BLD AUTO: 0 10E3/UL
NRBC BLD AUTO-RTO: 0 /100
P AXIS - MUSE: NORMAL DEGREES
PLATELET # BLD AUTO: 167 10E3/UL (ref 150–450)
PLATELET # BLD AUTO: 170 10E3/UL (ref 150–450)
POTASSIUM BLD-SCNC: 3.7 MMOL/L (ref 3.5–5)
PR INTERVAL - MUSE: NORMAL MS
PROT SERPL-MCNC: 5.9 G/DL (ref 6–8)
QRS DURATION - MUSE: 84 MS
QT - MUSE: 350 MS
QTC - MUSE: 482 MS
R AXIS - MUSE: 45 DEGREES
RBC # BLD AUTO: 3.24 10E6/UL (ref 4.4–5.9)
RBC # BLD AUTO: 3.41 10E6/UL (ref 4.4–5.9)
SODIUM SERPL-SCNC: 142 MMOL/L (ref 136–145)
SYSTOLIC BLOOD PRESSURE - MUSE: 172 MMHG
T AXIS - MUSE: -64 DEGREES
UNIT ABO/RH: NORMAL
UNIT NUMBER: NORMAL
UNIT STATUS: NORMAL
UNIT TYPE ISBT: 8400
VENTRICULAR RATE- MUSE: 114 BPM
WBC # BLD AUTO: 13.2 10E3/UL (ref 4–11)
WBC # BLD AUTO: 14.5 10E3/UL (ref 4–11)

## 2021-12-16 PROCEDURE — 82040 ASSAY OF SERUM ALBUMIN: CPT | Performed by: INTERNAL MEDICINE

## 2021-12-16 PROCEDURE — 36415 COLL VENOUS BLD VENIPUNCTURE: CPT | Performed by: INTERNAL MEDICINE

## 2021-12-16 PROCEDURE — 83036 HEMOGLOBIN GLYCOSYLATED A1C: CPT | Performed by: INTERNAL MEDICINE

## 2021-12-16 PROCEDURE — 250N000013 HC RX MED GY IP 250 OP 250 PS 637: Performed by: INTERNAL MEDICINE

## 2021-12-16 PROCEDURE — 85610 PROTHROMBIN TIME: CPT | Performed by: INTERNAL MEDICINE

## 2021-12-16 PROCEDURE — 83735 ASSAY OF MAGNESIUM: CPT | Performed by: INTERNAL MEDICINE

## 2021-12-16 PROCEDURE — 250N000011 HC RX IP 250 OP 636: Performed by: INTERNAL MEDICINE

## 2021-12-16 PROCEDURE — 85018 HEMOGLOBIN: CPT | Performed by: INTERNAL MEDICINE

## 2021-12-16 PROCEDURE — 120N000001 HC R&B MED SURG/OB

## 2021-12-16 PROCEDURE — 99233 SBSQ HOSP IP/OBS HIGH 50: CPT | Mod: GC | Performed by: INTERNAL MEDICINE

## 2021-12-16 PROCEDURE — 258N000003 HC RX IP 258 OP 636: Performed by: INTERNAL MEDICINE

## 2021-12-16 PROCEDURE — 250N000011 HC RX IP 250 OP 636: Performed by: STUDENT IN AN ORGANIZED HEALTH CARE EDUCATION/TRAINING PROGRAM

## 2021-12-16 PROCEDURE — P9016 RBC LEUKOCYTES REDUCED: HCPCS | Performed by: INTERNAL MEDICINE

## 2021-12-16 PROCEDURE — 85025 COMPLETE CBC W/AUTO DIFF WBC: CPT | Performed by: INTERNAL MEDICINE

## 2021-12-16 PROCEDURE — 74176 CT ABD & PELVIS W/O CONTRAST: CPT

## 2021-12-16 RX ADMIN — FAMOTIDINE 20 MG: 20 TABLET, FILM COATED ORAL at 20:46

## 2021-12-16 RX ADMIN — SODIUM CHLORIDE: 9 INJECTION, SOLUTION INTRAVENOUS at 04:06

## 2021-12-16 RX ADMIN — HYDROMORPHONE HYDROCHLORIDE 0.5 MG: 1 INJECTION, SOLUTION INTRAMUSCULAR; INTRAVENOUS; SUBCUTANEOUS at 20:48

## 2021-12-16 RX ADMIN — FAMOTIDINE 20 MG: 20 TABLET, FILM COATED ORAL at 08:00

## 2021-12-16 RX ADMIN — VENLAFAXINE HYDROCHLORIDE 75 MG: 75 CAPSULE, EXTENDED RELEASE ORAL at 08:00

## 2021-12-16 RX ADMIN — HYDROMORPHONE HYDROCHLORIDE 0.5 MG: 1 INJECTION, SOLUTION INTRAMUSCULAR; INTRAVENOUS; SUBCUTANEOUS at 04:24

## 2021-12-16 RX ADMIN — LOSARTAN POTASSIUM 50 MG: 50 TABLET, FILM COATED ORAL at 08:00

## 2021-12-16 RX ADMIN — BUPROPION HYDROCHLORIDE 300 MG: 300 TABLET, EXTENDED RELEASE ORAL at 08:00

## 2021-12-16 RX ADMIN — DILTIAZEM HYDROCHLORIDE 180 MG: 180 CAPSULE, COATED, EXTENDED RELEASE ORAL at 08:01

## 2021-12-16 RX ADMIN — CEFTRIAXONE SODIUM 2 G: 2 INJECTION, POWDER, FOR SOLUTION INTRAMUSCULAR; INTRAVENOUS at 04:06

## 2021-12-16 RX ADMIN — ATORVASTATIN CALCIUM 20 MG: 10 TABLET, FILM COATED ORAL at 20:47

## 2021-12-16 ASSESSMENT — ACTIVITIES OF DAILY LIVING (ADL)
ADLS_ACUITY_SCORE: 5
ADLS_ACUITY_SCORE: 5
ADLS_ACUITY_SCORE: 7
ADLS_ACUITY_SCORE: 5
ADLS_ACUITY_SCORE: 5
ADLS_ACUITY_SCORE: 7
ADLS_ACUITY_SCORE: 5
ADLS_ACUITY_SCORE: 7
ADLS_ACUITY_SCORE: 5
ADLS_ACUITY_SCORE: 7
ADLS_ACUITY_SCORE: 5
ADLS_ACUITY_SCORE: 5
ADLS_ACUITY_SCORE: 7
ADLS_ACUITY_SCORE: 5
ADLS_ACUITY_SCORE: 5

## 2021-12-16 NOTE — PLAN OF CARE
Problem: Adult Inpatient Plan of Care  Goal: Optimal Comfort and Wellbeing  Outcome: Improving    Pt given IV Dilaudid x 1 for left flank pain, with good relief. Will continue to monitor.     Hgb at 1800 10.9. Pt given one time extra dose of Diltiazem d/t HR in the 130's--improved and tele now a-fib in the low 100's. Sepsis protocol triggered, lactic acid 2.3. Resident notified and came to see patient-- no new orders and vitals remain stable. Pt remains NPO. Will continue to closely monitor.

## 2021-12-16 NOTE — PROGRESS NOTES
[facility]    Medicine Progress Note - Hospitalist Service       Date of Admission:  12/15/2021    Assessment & Plan            Yovani Bauman is a 71 year old male admitted on 12/15/2021.     # Large left subcapsular left perinephric hematoma with extension into the retrorenal space and small focus of venous  active visitation, on CTA on 12/15/2021 at 6:17 AM.  Patient anticoagulated with Xarelto for A. fib.  -Last dose of Xarelto 12/14 at 10 PM.  -2/15 s/p anticoagulation reversal with dose of Kcentra, as D/W Pharm.D. Rita  -Stat urology consult, recommended conservative treatment with bedrest, serial hemoglobin, holding and reversing anticoagulation; currently no indications for IR embolization, as venous slow bleeding expected to subside, and limited by renal capsule, and it could complicate future nephrectomy if indicated.  - IR ablation for ongoing bleed/hemodynamic instability.  -Monitor renal function  -N.p.o., advance to regular diet on 12/16    #Acute blood loss anemia.  Due to #1.  Hemoglobin 13.3 on admission, down to 11 within 5 hours in ER, and 9.1 today.  Patient hemodynamically stable.    Transfuse 1 unit PRBCs, given 30% cervical to volume blood loss  Blood transfusion consent was obtained in ED, form was lost in transfer to , today patient was re-consented.    #Left kidney mass, 8 x 6 cm, concern for RCC.  -Urology consulted, patient to follow-up closely with Dr. Cazares.    #Bilateral renal simple cysts, without signs of infection or hemorrhage, per CT.    #History of prostate cancer, s/p prostatectomy.  Early castrate resistant recurrent prostate cancer. He is on androgen depravation therapy (next Eligard due after 12/31/2021), per urology.    #Atrial fibrillation, chronic.  Rate controlled with Cardizem.  Anticoagulated with Xarelto.  Last dose of Xarelto 12/14 at 10 PM.  Patient shared that his cardiology have plans for cardioversion within the next 2-3  weeks.  Continue home dose of Cardizem  mg.  Additional 30 mg of short acting Cardizem given in ED.    Telemetry for another day.    HTN, elevated BP, likely due to pain.  Continue PTA Cardizem, losartan at 1/2 home dose, holding HCTZ.    MDD, stable.  Continue PTA Wellbutrin, Effexor.    Morbid obesity,Body mass index is 50.65 kg/m .  Patient/family were educated on health benefits of normalized weight, lifestyle vacations to obtain healthy weight.     BG over 200.  Patient has morbid obesity.  Checking A1c to differentiate between stress hyperglycemia and undx DM.     HLD-on Lipitor.     Diet: NPO for Medical/Clinical Reasons Except for: Meds, Ice Chips    DVT Prophylaxis: Pneumatic Compression Devices  Bell Catheter: Not present  Central Lines: None  Code Status: Full Code      Disposition Plan   Expected Discharge: 12/18/2021     Anticipated discharge location: Anticipate discharge on 12/18.  Urology recommends another full day of hospitalization to assure hemodynamic stability.     The patient's care was discussed with the Bedside Nurse, Care Coordinator/, Patient and Patient's Family.    Eliza Keen MD  Hospitalist Service  [facility]  Securely message with the Vocera Web Console (learn more here)  Text page via [employer] Paging/Directory      Clinically Significant Risk Factors Present on Admission             _____________________________________________________________________    Interval History   Wife at the bedside during my visit.  Patient complained of ongoing left flank pain, worsened with mobility/transfer to the chair.  No hematuria, melena, hematochezia.  Feeling hungry.  Hemoglobin down to 9.1 today.  Patient feeling weak and lightheaded.  No chest pain, cough, dyspnea at rest.    Data reviewed today: I reviewed all medications, new labs and imaging results over the last 24 hours. I personally reviewed    Physical Exam   Vital Signs: Temp: 98.6  F (37   C) Temp src: Oral BP: 129/64 Pulse: 110   Resp: 20 SpO2: 97 % O2 Device: None (Room air)    Weight: 353 lbs 0 oz  General: Alert and oriented x 3. Not in obvious distress.  Morbidly obese.  HEENT: NC, AT. Neck- supple, No JVP elevation, lymphadenopathy or thyromegaly. Trachea-central.  Chest: Clear to auscultation bilaterally.  Heart: S1S2 regular. No M/R/G.  Abdomen: Soft.,  Obese, tender in left flank, no rebound tenderness.  Hypoactive bowel sounds.  Back: No spine tenderness. No CVA tenderness.  Extremities: No leg swelling. Peripheral pulses 2+ bilaterally.  Neuro: Cranial nerves 1-12 grossly normal. No focal neurological deficit    Data   Recent Labs   Lab 12/15/21  2354 12/15/21  1757 12/15/21  1403 12/15/21  0911 12/15/21  0307   WBC  --   --   --  14.4* 14.5*   HGB 9.5* 10.9* 11.0* 11.2* 13.3   MCV  --   --   --  86 85   PLT  --   --   --  159 195   INR  --   --   --   --  1.92*   NA  --   --   --  140 137   POTASSIUM  --   --   --  3.9 3.6   CHLORIDE  --   --   --  105 103   CO2  --   --   --  23 20*   BUN  --   --   --  22 21   CR  --   --   --  1.60* 1.65*   ANIONGAP  --   --   --  12 14   LARRY  --   --   --  8.9 9.6   GLC  --   --   --  214* 234*   ALBUMIN  --   --   --  3.1* 3.4*   PROTTOTAL  --   --   --  6.0 6.5   BILITOTAL  --   --   --  0.4 0.5   ALKPHOS  --   --   --  69 75   ALT  --   --   --  16 16   AST  --   --   --  11 15     No results found for this or any previous visit (from the past 24 hour(s)).

## 2021-12-16 NOTE — PROGRESS NOTES
Place of Service:  [facility]     Reason for follow up:left renal mass with subcapsular hematoma     SUBJECTIVE:  Events: No acute events overnigth    Patient reports left flank pain. Just getting back from CT, movement makes it worse. No nausea or vomiting. He has been NPO. No hematuria .    OBJECTIVE:  PHYSICAL EXAM:  Temp: 98.1  F (36.7  C) Temp src: Oral BP: 126/70 Pulse: 107   Resp: 20 SpO2: 94 % O2 Device: None (Room air)    General: NAD, alert, cooperative  Head: normocephalic, without abnormality / atraumatic  Abdomen: soft, non tender, non distended. no suprapubic fullness, no suprapubic tenderness. + left  CVA tenderness,   Skin: No rashes or lesions. No flank ecchymosis   Musculoskeletal: moves all four extremities equally; no calf edema or tenderness  Psychological: alert and oriented, answers questions appropriately    LABS:  Creatinine   Date Value Ref Range Status   12/16/2021 1.70 (H) 0.70 - 1.30 mg/dL Final     WBC Count   Date Value Ref Range Status   12/16/2021 13.2 (H) 4.0 - 11.0 10e3/uL Final     Hemoglobin   Date Value Ref Range Status   12/16/2021 9.1 (L) 13.3 - 17.7 g/dL Final     Platelet Count   Date Value Ref Range Status   12/16/2021 167 150 - 450 10e3/uL Final       UA:  UA RESULTS:  Recent Labs   Lab Test 12/15/21  0724   COLOR Light Yellow   APPEARANCE Clear   URINEGLC 300 *   URINEBILI Negative   URINEKETONE Negative   SG 1.048*   UBLD 0.5 mg/dL*   URINEPH 5.0   PROTEIN 20 *   NITRITE Negative   LEUKEST Negative   RBCU 13*   WBCU 2     Imaging   EXAM: CT ABDOMEN PELVIS W/O CONTRAST  LOCATION: [facility]  DATE/TIME: 12/16/2021 8:36 AM  IMPRESSION:   1.  Stable subcapsular hematoma left kidney with compression and persistent enhancement of the cortical parenchyma. The compression can cause hypertension and the persistent enhancement indicates acute kidney injury.  2.  Stable 8 x 8 x 6 cm hyperdense component arising in a convex fashion from the  posterior aspect of the left kidney which could represent additional subcapsular blood products or bleeding into a preexisting lesion.  3.  Only minimal increase in the diffuse blood products throughout the left perinephric space and space of Retzius.    ASSESSMENT/PLAN:  Assessment/plan:   Yovani Bauman is being seen by [facility] for renal mass on the left side measuring 8.3x 6cm with large left subcapsular left perinephric hematoma with extension into the retrorenal space. Small focus of venous active extravasation. He also has recurrent prostate cancer receiving daily beam radiation.        -Hg 9.1 today, initially 13.3 12/15/2021 at 3:07 am. Spoke with Dr. Keen. Patent will receive 1 unit RBC today.  - Patient currently hemodynamically stable with normal blood pressure. Tachycardia persisting however improved.   -Patient made NPO for repeat CT today by Dr. Cazares last night  - CT resulted, discussed with Dr. Cazares, stable subcapsular hematoma at this time. Continue conservative management.   -Early castrate resistant recurrent prostate cancer. He is on androgen depravation therapy (next Eligard due after 12/31/2021) and  receiving daily beam radiation in the outpatient setting, per medicine will hold radiation at this time  -Recommendations for Xeralto discussed with Dr. Cazares, recommends holding medication for 2 weeks.  - Patient will need outpatient MRI for further evaluation of his renal mass prior to follow up with Dr. Cazares . This will need to be open MRI due to body habbitus  -From urologic standpoint, patient will be ok for discharge when hemoglobin remains stable and pain managed.   -Urology will continue to follow    This case was discussed with:  Dr Kyle Conrad PAVinC  [facility]   [phone]

## 2021-12-16 NOTE — SIGNIFICANT EVENT
Sepsis Evaluation Progress Note    I was called to see Yovani Bauman due to abnormal vital signs triggering the Sepsis SIRS screening alert. He is not known to have an infection.  He is admitted for a left kidney mass and hematoma.  Known history of A. Fib, was given adult in the ED with some initial improvement in heart rate    Physical Exam   Vital Signs:  Temp: 98.4  F (36.9  C) Temp src: Oral BP: 133/74 Pulse: 106   Resp: 24 SpO2: 95 % O2 Device: BiPAP/CPAP       General: in no acute distress  Mental Status: AAOx4.   Pulm: CTA bilaterally   Abdomen: Nontender, soft  Skin: No lesions on limited skin exam      Data   Lactic Acid   Date Value Ref Range Status   12/15/2021 2.3 (H) 0.7 - 2.0 mmol/L Final   12/15/2021 2.4 (H) 0.7 - 2.0 mmol/L Final       Assessment & Plan   NO EVIDENCE OF SEPSIS at this time.  Vital sign, physical exam, and lab findings are due to A. fib.  His lactic acid is actually downtrending from 2.8-2.4.  His WBC is also downtrending.  His heart rate is improved with diltiazem.  He is afebrile.    Disposition: The patient will remain on the current unit. We will continue to monitor this patient closely. No further treatment indicated at this time.   Frank Varghese MD    Sepsis Criteria   Sepsis: 2+ SIRS criteria due to infection  Severe Sepsis: Sepsis AND 1+ new sign of acute organ dysfunction (Note: lactate >2 or acute encephalopathy each qualify as organ dysfunction)  Septic Shock: Sepsis AND hypotension despite volume resuscitation with 30 ml/kg crystalloid or lactate >=4  Note: HYPOTENSION is defined as 2 BP readings measured 3 hrs apart that have a SBP <90, MAP <65, or decrease >40 mmHg, occurring 6 hrs before or after t-zero

## 2021-12-16 NOTE — PLAN OF CARE
Pt slept well overnight. Minimal complaints of bilateral flank pain that radiates to pts abdomen. PRN Dilaudid given x1. No nausea noted. Afib on the monitor. Urology following. Hgb every 6hrs. Last hgb was 9.5. Recheck in the am. NPO. IVF. Pt is alert and orientated. Up with a stand by assist with tranfers and ambulation. Will continue to monitor.

## 2021-12-16 NOTE — PLAN OF CARE
Problem: Adult Inpatient Plan of Care  Goal: Plan of Care Review  Outcome: Improving     Problem: Pain Acute  Goal: Acceptable Pain Control and Functional Ability  Outcome: Improving     Problem: Anemia  Goal: Anemia Symptom Improvement  Outcome: Improving     Pt denied pain all shift.  Pt ate 100% of lunch.  IV fluids stopped per orders.  One unit of PRBC given per order.  Pt able to void in urinal, no BM.

## 2021-12-17 LAB
ALBUMIN SERPL-MCNC: 2.9 G/DL (ref 3.5–5)
ALP SERPL-CCNC: 66 U/L (ref 45–120)
ALT SERPL W P-5'-P-CCNC: 19 U/L (ref 0–45)
ANION GAP SERPL CALCULATED.3IONS-SCNC: 9 MMOL/L (ref 5–18)
AST SERPL W P-5'-P-CCNC: 16 U/L (ref 0–40)
BILIRUB SERPL-MCNC: 0.4 MG/DL (ref 0–1)
BUN SERPL-MCNC: 28 MG/DL (ref 8–22)
CALCIUM SERPL-MCNC: 9.2 MG/DL (ref 8.5–10.5)
CHLORIDE BLD-SCNC: 103 MMOL/L (ref 98–107)
CHOLEST SERPL-MCNC: 108 MG/DL
CO2 SERPL-SCNC: 27 MMOL/L (ref 22–31)
CREAT SERPL-MCNC: 1.58 MG/DL (ref 0.7–1.3)
ERYTHROCYTE [DISTWIDTH] IN BLOOD BY AUTOMATED COUNT: 14.6 % (ref 10–15)
GFR SERPL CREATININE-BSD FRML MDRD: 44 ML/MIN/1.73M2
GLUCOSE BLD-MCNC: 153 MG/DL (ref 70–125)
HCT VFR BLD AUTO: 27.3 % (ref 40–53)
HDLC SERPL-MCNC: 50 MG/DL
HGB BLD-MCNC: 8.7 G/DL (ref 13.3–17.7)
HGB BLD-MCNC: 8.8 G/DL (ref 13.3–17.7)
INR PPP: 1.17 (ref 0.85–1.15)
IRON SERPL-MCNC: 25 UG/DL (ref 42–175)
LDLC SERPL CALC-MCNC: 39 MG/DL
MCH RBC QN AUTO: 28.3 PG (ref 26.5–33)
MCHC RBC AUTO-ENTMCNC: 32.2 G/DL (ref 31.5–36.5)
MCV RBC AUTO: 88 FL (ref 78–100)
PLATELET # BLD AUTO: 149 10E3/UL (ref 150–450)
POTASSIUM BLD-SCNC: 3.6 MMOL/L (ref 3.5–5)
PROT SERPL-MCNC: 6.2 G/DL (ref 6–8)
RBC # BLD AUTO: 3.11 10E6/UL (ref 4.4–5.9)
SODIUM SERPL-SCNC: 139 MMOL/L (ref 136–145)
TRIGL SERPL-MCNC: 96 MG/DL
WBC # BLD AUTO: 10.1 10E3/UL (ref 4–11)

## 2021-12-17 PROCEDURE — 80061 LIPID PANEL: CPT | Performed by: INTERNAL MEDICINE

## 2021-12-17 PROCEDURE — 85014 HEMATOCRIT: CPT | Performed by: INTERNAL MEDICINE

## 2021-12-17 PROCEDURE — 250N000013 HC RX MED GY IP 250 OP 250 PS 637: Performed by: INTERNAL MEDICINE

## 2021-12-17 PROCEDURE — 85610 PROTHROMBIN TIME: CPT | Performed by: INTERNAL MEDICINE

## 2021-12-17 PROCEDURE — 82947 ASSAY GLUCOSE BLOOD QUANT: CPT | Performed by: INTERNAL MEDICINE

## 2021-12-17 PROCEDURE — 250N000011 HC RX IP 250 OP 636: Performed by: INTERNAL MEDICINE

## 2021-12-17 PROCEDURE — 99233 SBSQ HOSP IP/OBS HIGH 50: CPT | Performed by: INTERNAL MEDICINE

## 2021-12-17 PROCEDURE — 83540 ASSAY OF IRON: CPT | Performed by: INTERNAL MEDICINE

## 2021-12-17 PROCEDURE — 85018 HEMOGLOBIN: CPT | Performed by: INTERNAL MEDICINE

## 2021-12-17 PROCEDURE — 36415 COLL VENOUS BLD VENIPUNCTURE: CPT | Performed by: INTERNAL MEDICINE

## 2021-12-17 PROCEDURE — 82374 ASSAY BLOOD CARBON DIOXIDE: CPT | Performed by: INTERNAL MEDICINE

## 2021-12-17 PROCEDURE — 120N000001 HC R&B MED SURG/OB

## 2021-12-17 RX ORDER — POTASSIUM CHLORIDE 1500 MG/1
20 TABLET, EXTENDED RELEASE ORAL ONCE
Status: COMPLETED | OUTPATIENT
Start: 2021-12-17 | End: 2021-12-17

## 2021-12-17 RX ORDER — AMOXICILLIN 250 MG
2 CAPSULE ORAL AT BEDTIME
Status: DISCONTINUED | OUTPATIENT
Start: 2021-12-17 | End: 2021-12-18 | Stop reason: HOSPADM

## 2021-12-17 RX ADMIN — BUPROPION HYDROCHLORIDE 300 MG: 300 TABLET, EXTENDED RELEASE ORAL at 09:28

## 2021-12-17 RX ADMIN — FAMOTIDINE 20 MG: 20 TABLET, FILM COATED ORAL at 20:28

## 2021-12-17 RX ADMIN — ATORVASTATIN CALCIUM 20 MG: 10 TABLET, FILM COATED ORAL at 21:38

## 2021-12-17 RX ADMIN — POTASSIUM CHLORIDE 20 MEQ: 1500 TABLET, EXTENDED RELEASE ORAL at 15:40

## 2021-12-17 RX ADMIN — LOSARTAN POTASSIUM 50 MG: 50 TABLET, FILM COATED ORAL at 09:27

## 2021-12-17 RX ADMIN — DILTIAZEM HYDROCHLORIDE 180 MG: 180 CAPSULE, COATED, EXTENDED RELEASE ORAL at 09:28

## 2021-12-17 RX ADMIN — VENLAFAXINE HYDROCHLORIDE 75 MG: 75 CAPSULE, EXTENDED RELEASE ORAL at 09:27

## 2021-12-17 RX ADMIN — CEFTRIAXONE SODIUM 2 G: 2 INJECTION, POWDER, FOR SOLUTION INTRAMUSCULAR; INTRAVENOUS at 06:03

## 2021-12-17 RX ADMIN — FAMOTIDINE 20 MG: 20 TABLET, FILM COATED ORAL at 09:27

## 2021-12-17 ASSESSMENT — ACTIVITIES OF DAILY LIVING (ADL)
ADLS_ACUITY_SCORE: 5
ADLS_ACUITY_SCORE: 7
ADLS_ACUITY_SCORE: 5
ADLS_ACUITY_SCORE: 7
ADLS_ACUITY_SCORE: 5
ADLS_ACUITY_SCORE: 7
ADLS_ACUITY_SCORE: 5
ADLS_ACUITY_SCORE: 7

## 2021-12-17 NOTE — PROGRESS NOTES
[facility]    Medicine Progress Note - Hospitalist Service       Date of Admission:  12/15/2021    Assessment & Plan              Yovani Bauman is a 71 year old male admitted on 12/15/2021.     # Large left subcapsular left perinephric hematoma with extension into the retrorenal space and small focus of venous  active visitation, on CTA on 12/15/2021 at 6:17 AM.  Patient anticoagulated with Xarelto for A. fib.  -Last dose of Xarelto 12/14 at 10 PM.  -2/15 s/p anticoagulation reversal with dose of Kcentra, as D/W Pharm.D. Rita  -Urology consulted, recommended conservative treatment with bedrest, serial hemoglobin, holding and reversing anticoagulation; currently no indications for IR embolization, as venous slow bleeding expected to subside, and limited by renal capsule, and it could complicate future nephrectomy if indicated.  - IR only ablation for ongoing bleed/hemodynamic instability.  -Monitor renal function, improving so far    #Acute blood loss anemia.  Due to #1.  Hemoglobin 13.3 on admission, down to 11 within 5 hours in ER, and 9.1 on 12/16.  Patient hemodynamically stable.    12/16 s/p 1 unit PRBCs, given 30% volume blood loss  Hemoglobin today 8.8, with stable left flank pain.  Will recheck at 3 PM.  And transfuse if trending downwards.  Check iron level-?  Iron deficiency of CKD 3.    #Left kidney mass, 8 x 6 cm, concern for RCC.  -Urology consulted, patient to follow-up closely with Dr. Cazares.    #Bilateral renal simple cysts, without signs of infection or hemorrhage, per CT.    #CKD stage III.  Creatinine around 1.5-1.6.  Getting close to baseline.    #History of prostate cancer, s/p prostatectomy.  Early castrate resistant recurrent prostate cancer. He is on androgen depravation therapy (next Eligard due after 12/31/2021), per urology.    #Atrial fibrillation, chronic.  Rate controlled with Cardizem.  Anticoagulated with Xarelto.  Last dose of Xarelto 12/14 at 10  PM.  Patient shared that his cardiology have plans for cardioversion within the next 2-3 weeks.  Continue home dose of Cardizem  mg.  Additional 30 mg of short acting Cardizem given in ED.    Telemetry for another day.  D/W patient and family rationale for holding Xarelto.  Consider resuming when okay with urology.    HTN, elevated BP, likely due to pain.  Continue PTA Cardizem, losartan at 1/2 home dose, holding HCTZ.    MDD, stable.  Continue PTA Wellbutrin, Effexor.    Morbid obesity,Body mass index is 50.65 kg/m .  Patient/family were educated on health benefits of normalized weight, lifestyle vacations to obtain healthy weight.     BG over 200.  Patient has morbid obesity.  Checking A1c to differentiate between stress hyperglycemia and undx DM.     HLD-on Lipitor.     Diet: Regular Diet Adult    DVT Prophylaxis: Pneumatic Compression Devices  Bell Catheter: Not present  Central Lines: None  Code Status: Full Code      Disposition Plan   Expected Discharge: 12/18/2021     Anticipated discharge location: Anticipate discharge on 12/18, hemoglobin stable.    Remained stable.     The patient's care was discussed with the Bedside Nurse, Care Coordinator/, Patient and Patient's Family.-With patient's wife at bedside    Eliza Keen MD  Hospitalist Service  [facility]  Securely message with the Vocera Web Console (learn more here)  Text page via [employer] Paging/Directory      Clinically Significant Risk Factors Present on Admission             _____________________________________________________________________    Interval History   Wife at the bedside during my visit.  Patient complained of ongoing left flank pain, worsened with mobility/transfer to the chair.  No hematuria, melena, hematochezia.  No bruit in left flank.  Hemoglobin 8.8 today, down from 9.1 yesterday and received 1 unit of PRBCs yesterday.  Patient denies lightheadedness.  Ambulating short distances in  the room only.  He denies chest pain, cough, dyspnea at rest.    Data reviewed today: I reviewed all medications, new labs and imaging results over the last 24 hours. I personally reviewed    Physical Exam   Vital Signs: Temp: 98.2  F (36.8  C) Temp src: Oral BP: 123/63 Pulse: 95   Resp: 20 SpO2: 93 % O2 Device: None (Room air)    Weight: 353 lbs 0 oz  General: Alert and oriented x 3. Not in obvious distress.  Morbidly obese.  HEENT: NC, AT. Neck- supple, No JVP elevation, lymphadenopathy or thyromegaly. Trachea-central.  Chest: Clear to auscultation bilaterally.  Heart: S1S2 regular. No M/R/G.  Abdomen: Soft.,  Obese, tender in left flank, no rebound tenderness.  Hypoactive bowel sounds.  Back: No spine tenderness. No CVA tenderness.  Extremities: No leg swelling. Peripheral pulses 2+ bilaterally.  Neuro: Cranial nerves 1-12 grossly normal. No focal neurological deficit    Data   Recent Labs   Lab 12/16/21  1803 12/16/21  1149 12/16/21  0742 12/15/21  2354 12/15/21  1403 12/15/21  0911 12/15/21  0307   WBC  --   --  13.2* 14.5*  --  14.4* 14.5*   HGB 9.1* 8.3* 9.1* 9.5*  9.5*   < > 11.2* 13.3   MCV  --   --  88 87  --  86 85   PLT  --   --  167 170  --  159 195   INR  --   --  1.30*  --   --   --  1.92*   NA  --   --  142  --   --  140 137   POTASSIUM  --   --  3.7  --   --  3.9 3.6   CHLORIDE  --   --  106  --   --  105 103   CO2  --   --  27  --   --  23 20*   BUN  --   --  27*  --   --  22 21   CR  --   --  1.70*  --   --  1.60* 1.65*   ANIONGAP  --   --  9  --   --  12 14   LARRY  --   --  9.4  --   --  8.9 9.6   GLC  --   --  139*  --   --  214* 234*   ALBUMIN  --   --  3.0*  --   --  3.1* 3.4*   PROTTOTAL  --   --  5.9*  --   --  6.0 6.5   BILITOTAL  --   --  0.3  --   --  0.4 0.5   ALKPHOS  --   --  61  --   --  69 75   ALT  --   --  14  --   --  16 16   AST  --   --  11  --   --  11 15    < > = values in this interval not displayed.     No results found for this or any previous visit (from the past 24  hour(s)).

## 2021-12-17 NOTE — PLAN OF CARE
No new complaints this shift. Received IV Dilaudid x 1 for left flank pain. Hgb 9.1 post transfusion. CPAP at night. VSS, will continue to monitor.

## 2021-12-17 NOTE — PLAN OF CARE
Pt slept well overnight. Minimal complaints of pain. Stated he had pain with movement but declined the need for pain meds. No nausea noted. Afebrile. IV abx given. Hgb stable. Recheck in the am. CPAP overnight. Urology following. Pt is alert and orientated. Up stand by assist with tranfers and ambulation. Possible discharge home today. Will continue to monitor.

## 2021-12-17 NOTE — PLAN OF CARE
Problem: Pain Acute  Goal: Acceptable Pain Control and Functional Ability  Outcome: Improving  Patient c/o pain to LLQ, radiating to back only with movement.    Problem: Anemia  Goal: Anemia Symptom Improvement  Outcome: Improving   Hgb stable at 8.8 and 8.7.  Recheck in the A.M.     Problem: Hypertension Acute  Goal: Blood Pressure Within Desired Range  Outcome: Improving  BP elevated at 150/71, 140/63. Scheduled antihypertensives given.     Patient denies nausea this shift and ate 100 % of meals. Afib with PVC's on tele.

## 2021-12-17 NOTE — PROGRESS NOTES
Place of Service:  [facility]     Reason for follow up:left renal mass with subcapsular hematoma     SUBJECTIVE:  Events: No acute events overnigth    Patient reports left flank pain when getting in and out of bed. No pain at rest.. No nausea or vomiting. He has been tolerating general diet. No hematuria.      OBJECTIVE:  PHYSICAL EXAM:  Temp: 98.2  F (36.8  C) Temp src: Oral BP: (!) 150/71 Pulse: 104   Resp: 20 SpO2: 93 % O2 Device: None (Room air)    General: NAD, alert, cooperative. Sitting in chair  Head: normocephalic, without abnormality / atraumatic  Abdomen: soft, non tender, non distended. no suprapubic fullness, no suprapubic tenderness. + left  CVA tenderness,   Skin: No rashes or lesions. No flank ecchymosis   Musculoskeletal: moves all four extremities equally; no calf edema or tenderness  Psychological: alert and oriented, answers questions appropriately    LABS:  Creatinine   Date Value Ref Range Status   12/16/2021 1.70 (H) 0.70 - 1.30 mg/dL Final     WBC Count   Date Value Ref Range Status   12/17/2021 10.1 4.0 - 11.0 10e3/uL Final     Hemoglobin   Date Value Ref Range Status   12/17/2021 8.8 (L) 13.3 - 17.7 g/dL Final     Platelet Count   Date Value Ref Range Status   12/17/2021 149 (L) 150 - 450 10e3/uL Final       UA:  UA RESULTS:  Recent Labs   Lab Test 12/15/21  0724   COLOR Light Yellow   APPEARANCE Clear   URINEGLC 300 *   URINEBILI Negative   URINEKETONE Negative   SG 1.048*   UBLD 0.5 mg/dL*   URINEPH 5.0   PROTEIN 20 *   NITRITE Negative   LEUKEST Negative   RBCU 13*   WBCU 2     Imaging   EXAM: CT ABDOMEN PELVIS W/O CONTRAST  LOCATION: [facility]  DATE/TIME: 12/16/2021 8:36 AM  IMPRESSION:   1.  Stable subcapsular hematoma left kidney with compression and persistent enhancement of the cortical parenchyma. The compression can cause hypertension and the persistent enhancement indicates acute kidney injury.  2.  Stable 8 x 8 x 6 cm hyperdense component  arising in a convex fashion from the posterior aspect of the left kidney which could represent additional subcapsular blood products or bleeding into a preexisting lesion.  3.  Only minimal increase in the diffuse blood products throughout the left perinephric space and space of Retzius.    ASSESSMENT/PLAN:  Assessment/plan:   Yovani Bauman is being seen by [facility] for renal mass on the left side measuring 8.3x 6cm with large left subcapsular left perinephric hematoma with extension into the retrorenal space. Small focus of venous active extravasation. He also has recurrent prostate cancer receiving daily beam radiation.        -Hg 8.8 today, continue to monitor  - Patient currently hemodynamically stable with normal blood pressure. Tachycardia persisting however improved.   -Patient made NPO for repeat CT today by Dr. Cazares last night  -Early castrate resistant recurrent prostate cancer. He is on androgen depravation therapy (next Eligard due after 12/31/2021) and  receiving daily beam radiation in the outpatient setting, per medicine will hold radiation at this time  -Recommendations for Xeralto discussed with Dr. Cazares, recommends holding medication for 2 weeks upon hemoglobin stabilization.  - Patient will need outpatient MRI for further evaluation of his renal mass prior to follow up with Dr. Cazares . This will need to be open MRI due to body habbitus  -From urologic standpoint, patient will be ok for discharge when hemoglobin remains stable and pain managed.   -Urology will continue to follow        Nayeli Conrad PA-C  [facility]   [phone]

## 2021-12-18 VITALS
WEIGHT: 315 LBS | DIASTOLIC BLOOD PRESSURE: 61 MMHG | HEIGHT: 70 IN | RESPIRATION RATE: 20 BRPM | HEART RATE: 87 BPM | OXYGEN SATURATION: 91 % | BODY MASS INDEX: 45.1 KG/M2 | TEMPERATURE: 97.9 F | SYSTOLIC BLOOD PRESSURE: 108 MMHG

## 2021-12-18 PROBLEM — E61.1 IRON DEFICIENCY: Status: ACTIVE | Noted: 2021-12-18

## 2021-12-18 LAB
ANION GAP SERPL CALCULATED.3IONS-SCNC: 8 MMOL/L (ref 5–18)
BUN SERPL-MCNC: 25 MG/DL (ref 8–22)
CALCIUM SERPL-MCNC: 8.6 MG/DL (ref 8.5–10.5)
CHLORIDE BLD-SCNC: 106 MMOL/L (ref 98–107)
CO2 SERPL-SCNC: 26 MMOL/L (ref 22–31)
CREAT SERPL-MCNC: 1.33 MG/DL (ref 0.7–1.3)
ERYTHROCYTE [DISTWIDTH] IN BLOOD BY AUTOMATED COUNT: 14.7 % (ref 10–15)
GFR SERPL CREATININE-BSD FRML MDRD: 54 ML/MIN/1.73M2
GLUCOSE BLD-MCNC: 135 MG/DL (ref 70–125)
HCT VFR BLD AUTO: 26.2 % (ref 40–53)
HGB BLD-MCNC: 8.4 G/DL (ref 13.3–17.7)
MCH RBC QN AUTO: 28.4 PG (ref 26.5–33)
MCHC RBC AUTO-ENTMCNC: 32.1 G/DL (ref 31.5–36.5)
MCV RBC AUTO: 89 FL (ref 78–100)
PLATELET # BLD AUTO: 155 10E3/UL (ref 150–450)
POTASSIUM BLD-SCNC: 3.7 MMOL/L (ref 3.5–5)
RBC # BLD AUTO: 2.96 10E6/UL (ref 4.4–5.9)
SODIUM SERPL-SCNC: 140 MMOL/L (ref 136–145)
WBC # BLD AUTO: 8.6 10E3/UL (ref 4–11)

## 2021-12-18 PROCEDURE — 36415 COLL VENOUS BLD VENIPUNCTURE: CPT | Performed by: INTERNAL MEDICINE

## 2021-12-18 PROCEDURE — 250N000013 HC RX MED GY IP 250 OP 250 PS 637: Performed by: INTERNAL MEDICINE

## 2021-12-18 PROCEDURE — 85014 HEMATOCRIT: CPT | Performed by: INTERNAL MEDICINE

## 2021-12-18 PROCEDURE — 258N000003 HC RX IP 258 OP 636: Performed by: INTERNAL MEDICINE

## 2021-12-18 PROCEDURE — 99239 HOSP IP/OBS DSCHRG MGMT >30: CPT | Performed by: INTERNAL MEDICINE

## 2021-12-18 PROCEDURE — 250N000011 HC RX IP 250 OP 636: Performed by: INTERNAL MEDICINE

## 2021-12-18 PROCEDURE — 80048 BASIC METABOLIC PNL TOTAL CA: CPT | Performed by: INTERNAL MEDICINE

## 2021-12-18 RX ORDER — FERROUS SULFATE 325(65) MG
325 TABLET ORAL DAILY
Qty: 30 TABLET | Refills: 0 | Status: SHIPPED | OUTPATIENT
Start: 2021-12-19 | End: 2022-01-18

## 2021-12-18 RX ORDER — LOSARTAN POTASSIUM 50 MG/1
50 TABLET ORAL DAILY
Qty: 30 TABLET | Refills: 0 | Status: SHIPPED | OUTPATIENT
Start: 2021-12-19 | End: 2021-12-19

## 2021-12-18 RX ORDER — FERROUS SULFATE 325(65) MG
325 TABLET ORAL DAILY
Status: DISCONTINUED | OUTPATIENT
Start: 2021-12-19 | End: 2021-12-18 | Stop reason: HOSPADM

## 2021-12-18 RX ADMIN — CEFTRIAXONE SODIUM 2 G: 2 INJECTION, POWDER, FOR SOLUTION INTRAMUSCULAR; INTRAVENOUS at 04:53

## 2021-12-18 RX ADMIN — FAMOTIDINE 20 MG: 20 TABLET, FILM COATED ORAL at 07:55

## 2021-12-18 RX ADMIN — BUPROPION HYDROCHLORIDE 300 MG: 300 TABLET, EXTENDED RELEASE ORAL at 07:55

## 2021-12-18 RX ADMIN — IRON SUCROSE 300 MG: 20 INJECTION, SOLUTION INTRAVENOUS at 09:53

## 2021-12-18 RX ADMIN — VENLAFAXINE HYDROCHLORIDE 75 MG: 75 CAPSULE, EXTENDED RELEASE ORAL at 07:55

## 2021-12-18 RX ADMIN — LOSARTAN POTASSIUM 50 MG: 50 TABLET, FILM COATED ORAL at 07:54

## 2021-12-18 RX ADMIN — DILTIAZEM HYDROCHLORIDE 180 MG: 180 CAPSULE, COATED, EXTENDED RELEASE ORAL at 07:54

## 2021-12-18 ASSESSMENT — ACTIVITIES OF DAILY LIVING (ADL)
ADLS_ACUITY_SCORE: 5

## 2021-12-18 NOTE — DISCHARGE SUMMARY
[facility]  Hospitalist Discharge Summary      Date of Admission:  12/15/2021  Date of Discharge:  12/18/2021  Discharging Provider: Eliza Keen MD    Discharge Diagnoses   Large left subcapsular left perinephric hematoma with extension into the retroperitoneal space  Cute blood loss anemia  Iron deficiency  CKD stage III  Large left renal mass  Bilateral renal simple cysts  History of prostate cancer  Chronic atrial fibrillation  Accelerated hypertension  Morbid obesity, BMI 50.6    Follow-ups Needed After Discharge   Follow-up Appointments     Follow-up and recommended labs and tests      Please follow up with Dr Cazares for further discussion on your renal   mass and subcapsular hemorrhage. You will also need a renal MRI. You may   call to confirm appointment as needed. [facility],   [phone]         Follow-up and recommended labs and tests       Follow up with primary care provider, MARANDA GIPSON, within 5-7 days   to evaluate medication change, to evaluate treatment change, and for   hospital follow- up.  The following labs/tests are recommended: cbc, bmp.       Unresulted Labs Ordered in the Past 30 Days of this Admission     Date and Time Order Name Status Description    12/15/2021  4:17 AM Blood Culture Line, venous Preliminary     12/15/2021  4:17 AM Blood Culture Peripheral Blood Preliminary       These results will be followed up by me    Discharge Disposition   Discharged to home  Condition at discharge: Stable    Hospital Course   Yovani Bauman is a 71 year old male with pertinent medical history of prostate cancer, morbid obesity, atrial flutter, and hypertension who presents to the ED for evaluation of sudden onset of left flank pain.  It occurred 12/14 at 2300, and left flank, sharp, radiating to left lower back and abdomen.  Patient is consistent, stabbing, associated with nausea and diaphoresis.     # Large left subcapsular left  perinephric hematoma with extension into the retrorenal space and small focus of venous  active visitation, on CTA on 12/15/2021 at 6:17 AM.  Patient anticoagulated with Xarelto for A. fib.  -Last dose of Xarelto 12/14 at 10 PM.  -2/15 s/p anticoagulation reversal with dose of Kcentra, as D/W Pharm.D. Rita  -Urology consulted, recommended conservative treatment with bedrest, serial hemoglobin, holding and reversing anticoagulation; currently no indications for IR embolization, as venous slow bleeding expected to subside, and limited by renal capsule, and it could complicate future nephrectomy if indicated.  -During stabilizes conservative management.  Patient required 1 unit PRBC for hemoglobin of 9.1, down from baseline of 13.3.  -He was diagnosed with iron deficiency anemia, low iron at 25, likely due to CKD three.  He received 200 mg of Venofer on 12/18/2021, and ferrous sulfate prescribed at discharge.     #Left kidney mass, 8 x 6 cm, concern for RCC.  -Urology consulted, patient to follow-up closely with Dr. Cazares.     #Bilateral renal simple cysts, without signs of infection or hemorrhage, per CT.     #CKD stage III.  Baseline creatinine around 1.5-1.6.      #History of prostate cancer, s/p prostatectomy.  Early castrate resistant recurrent prostate cancer. He is on androgen depravation therapy (next Eligard due after 12/31/2021), per urology.     #Atrial fibrillation, chronic.  Rate controlled with Cardizem.  Anticoagulated with Xarelto.  Last dose of Xarelto 12/14 at 10 PM.  Anticoagulation reversal as above.  Per urology, hold anticoagulation for 2 weeks after hemodynamic stabilization for perinephric bleeding.  On Cardizem for rate control.     HTN, elevated BP, likely due to pain.  Continued PTA Cardizem, losartan at 1/2 home dose, holding HCTZ.     MDD, stable.  Continued PTA Wellbutrin, Effexor.     Morbid obesity,Body mass index is 50.65 kg/m .  Patient/family were educated on health benefits of  normalized weight, lifestyle vacations to obtain healthy weight.      BG over 200.  Patient has morbid obesity.  A1c 5.9, less likely prediabetic versus hyperglycemia of stress.  Follow-up with PCP.    Consultations This Hospital Stay   UROLOGY IP CONSULT  CARE MANAGEMENT / SOCIAL WORK IP CONSULT    Code Status   Full Code    Time Spent on this Encounter   I, Eliza Keen MD, personally saw the patient today and spent greater than 30 minutes discharging this patient.       Eliza Keen MD  [address] [address]  Phone: [phone]  Fax: [phone]  ______________________________________________________________________    Physical Exam   Vital Signs: Temp: 97.8  F (36.6  C) Temp src: Oral BP: 136/63 Pulse: 89   Resp: 20 SpO2: 95 % O2 Device: None (Room air)    Weight: 353 lbs 0 oz  General: Alert and oriented x 3. Not in obvious distress.  Morbidly obese.  HEENT: NC, AT. Neck- supple, No JVP elevation, lymphadenopathy or thyromegaly. Trachea-central.  Chest: Clear to auscultation bilaterally.  Heart: S1S2 regular. No M/R/G.  Abdomen: Soft.,  Obese, tender in left flank, no rebound tenderness.  Hypoactive bowel sounds.  Back: No spine tenderness. No CVA tenderness.  Extremities: No leg swelling. Peripheral pulses 2+ bilaterally.  Neuro: Cranial nerves 1-12 grossly normal. No focal neurological deficit       Primary Care Physician   MARANDA GIPSON    Discharge Orders      MR Renal wo & w Contrast     Follow-up and recommended labs and tests    Please follow up with Dr Cazares for further discussion on your renal mass and subcapsular hemorrhage. You will also need a renal MRI. You may call to confirm appointment as needed. [facility], [phone]     Reason for your hospital stay    Ofelia nephric bleed     Activity    Your activity upon discharge: activity as tolerated     Follow-up and recommended labs and tests      Follow up with primary care provider, MARANDA GIPSON, within 5-7 days to evaluate medication change, to evaluate treatment change, and for hospital follow- up.  The following labs/tests are recommended: cbc, bmp.     Diet    Follow this diet upon discharge: Orders Placed This Encounter      Regular Diet Adult     Significant Results and Procedures   Results for orders placed or performed during the hospital encounter of 12/15/21   Abd/pelvis CT no contrast - Stone Protocol    Narrative    EXAM: CT ABDOMEN AND PELVIS WITHOUT CONTRAST  LOCATION: [facility]  DATE/TIME: 12/15/2021, 4:15 AM    INDICATION: Flank pain, kidney stone suspected.  COMPARISON: 09/16/2016.  TECHNIQUE: CT scan of the abdomen and pelvis was performed without IV contrast. Multiplanar reformats were obtained. Dose reduction techniques were used.  CONTRAST: None.    FINDINGS:   LOWER CHEST: There is a subpleural linear scar and/or atelectasis in the lung bases. Trace left pleural effusion. Coronary artery calcification.    HEPATOBILIARY: Liver is negative. Gallbladder is absent. No biliary dilatation.    PANCREAS: Normal.    SPLEEN: Normal.    ADRENAL GLANDS: Normal.    KIDNEYS/BLADDER: There is a heterogeneous exophytic mass arising off the left kidney posteromedially measuring up to 8 cm in diameter. This is indeterminate but concerning for potential malignancy. Large amount of acute blood products about the left   kidney in the perinephric space compatible with acute hematoma and may be related to the above-described mass but is indeterminate. Active bleeding cannot be excluded on a noncontrast study. Clinical correlation. There is probable at least a partial   component of subcapsular hemorrhage about the left kidney as well. Large benign cyst upper pole right kidney measures up to 12 cm in diameter. Additional smaller cyst also noted in the right kidney. Heterogeneous fat containing lesion in the right kidney    anteriorly is unchanged from 2016 and compatible with a benign angiomyolipoma. No urinary calculi or hydronephrosis. Bladder is unremarkable.    BOWEL: Normal.    LYMPH NODES: Normal.    VASCULATURE: Unremarkable.    PELVIC ORGANS: Prostatectomy.    MUSCULOSKELETAL: Moderate to marked degenerative and hypertrophic changes thoracic and lumbar spine.      Impression    IMPRESSION:   1.  There is a large acute left perinephric hematoma. There is also likely a significant subcapsular hematoma involving the left kidney as well. Evaluation for active bleeding cannot be made on a noncontrast study. CTA could be obtained in further   evaluation if indicated.    2.  There is a large heterogeneous exophytic 8 cm mass arising from the left kidney which could potentially be the source of the acute bleed although not definitive. This lesion is concerning for potential malignancy. Follow-up dedicated MRI of the   kidneys recommended for further evaluation when clinically appropriate.    3.  Large benign 12 cm cyst upper pole right kidney as well as a stable benign angiomyolipoma in the right kidney anteriorly.    4.  Postop prostatectomy. No adenopathy.      Findings called to Dr. Alexander on 12/15/2021 at 5:00 AM.     CTA Abdomen Pelvis with Contrast    Narrative    EXAM: CTA ABDOMEN PELVIS WITH CONTRAST  LOCATION: [facility]  DATE/TIME: 12/15/2021 6:17 AM    INDICATION: Left-sided renal bruising.  COMPARISON: None.  TECHNIQUE: CT angiogram abdomen pelvis during arterial phase of injection of IV contrast. 2D and 3D MIP reconstructions were performed by the CT technologist. Dose reduction techniques were used.  CONTRAST: 12/15/2021    FINDINGS:  ANGIOGRAM ABDOMEN/PELVIS: The aorta is widely patent and normal in caliber and contour. Patent celiac, superior mesenteric, renal, and inferior mesenteric arteries. On delayed imaging a focus of active extravasation is seen in the left perinephric   hematoma (10,  108). No active arterial extravasation is noted.    LOWER CHEST: Mild coronary artery disease. Small left pleural effusion.    HEPATOBILIARY: Status post cholecystectomy.    PANCREAS: Normal.    SPLEEN: Normal.    ADRENAL GLANDS: Normal.    KIDNEYS/BLADDER: A large left subcapsular perinephric hematoma is seen with extension into the retrorenal space. Additionally seen is an 8.3 x 6 cm exophytic mass arising from the left kidney. 3.9 cm left simple renal cyst No hydroureteronephrosis.    9.8 and 2.7 cm simple right renal cysts. No right-sided hydroureteronephrosis.    BOWEL: No bowel obstruction, colitis, diverticulitis, or appendicitis.    LYMPH NODES: Normal.    PELVIC ORGANS: Status post prostatectomy.    MUSCULOSKELETAL: Moderate multilevel discogenic degenerative change.      Impression    IMPRESSION:  1.  Large left subcapsular left perinephric hematoma with extension into the retrorenal space. Small focus of venous active extravasation (10, 107).  2.  8.3 x 6 cm exophytic masslike lesion arising from the medial left kidney concerning for a solid renal mass, though hemorrhagic cyst may be considered. Recommend consultation with urology to exclude renal cell carcinoma.  3.  Bilateral simple renal cysts, no follow-up required.  4.  Status post cholecystectomy.  5.  Small left pleural effusion.  6.  Mild atherosclerotic vascular disease and coronary artery disease.  7.  Status post prostatectomy.   CT Abdomen Pelvis w/o Contrast    Narrative    EXAM: CT ABDOMEN PELVIS W/O CONTRAST  LOCATION: [facility]  DATE/TIME: 12/16/2021 8:36 AM    INDICATION: Left perinephric hematoma in patient on chronic anticoagulation, status post reversal. Hypertension.  COMPARISON: 12/15/2021 6:00 AM CTA AP, 12/15/2021 4:00 AM noncontrast CT AP, and CTs AP 9/16/2016 and 6/16/2011.  TECHNIQUE: CT scan of the abdomen and pelvis was performed without IV contrast. Multiplanar reformats were obtained. Dose reduction  techniques were used.  CONTRAST: None.    FINDINGS:   LOWER CHEST: Small left pleural effusion and atelectasis throughout the basilar segments of the left lower lobe have increased.    HEPATOBILIARY: Normal.    PANCREAS: Normal.    SPLEEN: Normal.    ADRENAL GLANDS: Normal.    RIGHT KIDNEY AND URETER: A 14 x 12 x 10 cm and 3.5 cm benign-appearing upper pole cyst and a 3.5 cm benign angiomyolipoma mid right kidney unchanged. Right kidney and ureter otherwise normal.    LEFT KIDNEY AND URETER: Stable lateral and posterior subcapsular hematoma involving the entire length of the left kidney (up to 3 cm radial thickness) with compression and persistent enhancement of the underlying cortical parenchyma. Stable 8 x 8 x 6 cm   hyperdense component arising in a convex fashion from the posterior aspect of the kidney. Only minimal increase in the diffuse blood products in the left perinephric space throughout the abdomen and pelvis with some dispersal into the space of Retzius   anterior to the bladder. Approximate 4 x 3 x 3 cm cyst upper pole left kidney.    BLADDER: Mild muscular hypertrophy.     BOWEL: Normal.    LYMPH NODES: Normal.    VASCULATURE: Unremarkable.    PELVIC ORGANS: Prostatectomy.    MUSCULOSKELETAL: No suspicious bone lesions. Ankylosis of the thoracolumbar spine secondary to bridging endplate osteophyte formation.      Impression    IMPRESSION:   1.  Stable subcapsular hematoma left kidney with compression and persistent enhancement of the cortical parenchyma. The compression can cause hypertension and the persistent enhancement indicates acute kidney injury.  2.  Stable 8 x 8 x 6 cm hyperdense component arising in a convex fashion from the posterior aspect of the left kidney which could represent additional subcapsular blood products or bleeding into a preexisting lesion. Follow-up renal mass protocol CT or   MRI recommended in 1-2 months after resolution of blood products recommended.  3.  Only minimal  increase in the diffuse blood products throughout the left perinephric space and space of Retzius.           Discharge Medications   Current Discharge Medication List      START taking these medications    Details   ferrous sulfate (FEROSUL) 325 (65 Fe) MG tablet Take 1 tablet (325 mg) by mouth daily  Qty: 30 tablet, Refills: 0    Associated Diagnoses: Iron deficiency      losartan (COZAAR) 50 MG tablet Take 1 tablet (50 mg) by mouth daily  Qty: 30 tablet, Refills: 0    Associated Diagnoses: Uncontrolled hypertension         CONTINUE these medications which have NOT CHANGED    Details   atorvastatin (LIPITOR) 20 MG tablet Take 20 mg by mouth At Bedtime       buPROPion (WELLBUTRIN XL) 300 MG 24 hr tablet [BUPROPION (WELLBUTRIN XL) 300 MG 24 HR TABLET] Take 1 tablet (300 mg total) by mouth daily.  Qty: 30 tablet, Refills: 3    Associated Diagnoses: Major depressive disorder, recurrent episode, unspecified      diltiazem (CARDIZEM CD) 180 MG 24 hr capsule [DILTIAZEM (CARDIZEM CD) 180 MG 24 HR CAPSULE] Take 180 mg by mouth daily.      leuprolide, 3 Month, (ELIGARD) 22.5 MG Inject 22.5 mg Subcutaneous every 3 months      venlafaxine (EFFEXOR-XR) 75 MG 24 hr capsule Take 75 mg by mouth daily         STOP taking these medications       losartan-hydrochlorothiazide (HYZAAR) 100-25 MG tablet Comments:   Reason for Stopping:         rivaroxaban 20 mg Tab Comments:   Reason for Stopping:             Allergies   Allergies   Allergen Reactions     Other Environmental Allergy Unknown     Cockroaches, dust mites....the patient had allergy testing done and showed he might me allergic.  Never has had reaction     Shellfish Containing Products [Shellfish-Derived Products] Unknown     Pt did allergy testing and showed shellfish, pt has never had a reaction

## 2021-12-18 NOTE — PLAN OF CARE
Problem: Pain Acute  Goal: Acceptable Pain Control and Functional Ability  Outcome: Improving   Denies.    Problem: Anemia  Goal: Anemia Symptom Improvement  Outcome: Improving   Hgb 8.4 this A.M . IV Iron given prior to discharge.

## 2021-12-18 NOTE — PROGRESS NOTES
Place of Service:  [facility]     Reason for follow up:left renal mass with subcapsular hematoma     SUBJECTIVE:  Events: No acute events overnigth    Continues to have minimal pain, mostly with getting OOB to k[place]. No pain at rest. No nausea or vomiting. He has been tolerating general diet. No hematuria. HR 90s this morning. Hb pending.      OBJECTIVE:  PHYSICAL EXAM:  Temp: 97.8  F (36.6  C) Temp src: Oral BP: 136/63 Pulse: 89   Resp: 20 SpO2: 95 % O2 Device: None (Room air)    General: NAD, alert, cooperative. Sitting in chair  Head: normocephalic, without abnormality / atraumatic  Abdomen: soft, non tender, non distended. no suprapubic fullness, no suprapubic tenderness. no left  CVA tenderness,   Skin: No rashes or lesions. No flank ecchymosis   Musculoskeletal: moves all four extremities equally; no calf edema or tenderness  Psychological: alert and oriented, answers questions appropriately    LABS:  Creatinine   Date Value Ref Range Status   12/17/2021 1.58 (H) 0.70 - 1.30 mg/dL Final     WBC Count   Date Value Ref Range Status   12/17/2021 10.1 4.0 - 11.0 10e3/uL Final     Hemoglobin   Date Value Ref Range Status   12/17/2021 8.7 (L) 13.3 - 17.7 g/dL Final     Platelet Count   Date Value Ref Range Status   12/17/2021 149 (L) 150 - 450 10e3/uL Final       UA:  UA RESULTS:  Recent Labs   Lab Test 12/15/21  0724   COLOR Light Yellow   APPEARANCE Clear   URINEGLC 300 *   URINEBILI Negative   URINEKETONE Negative   SG 1.048*   UBLD 0.5 mg/dL*   URINEPH 5.0   PROTEIN 20 *   NITRITE Negative   LEUKEST Negative   RBCU 13*   WBCU 2     Imaging   EXAM: CT ABDOMEN PELVIS W/O CONTRAST  LOCATION: [facility]  DATE/TIME: 12/16/2021 8:36 AM  IMPRESSION:   1.  Stable subcapsular hematoma left kidney with compression and persistent enhancement of the cortical parenchyma. The compression can cause hypertension and the persistent enhancement indicates acute kidney injury.  2.  Stable 8 x 8 x 6  cm hyperdense component arising in a convex fashion from the posterior aspect of the left kidney which could represent additional subcapsular blood products or bleeding into a preexisting lesion.  3.  Only minimal increase in the diffuse blood products throughout the left perinephric space and space of Retzius.    ASSESSMENT/PLAN:  Assessment/plan:   Yovani Bauman is being seen by [facility] for renal mass on the left side measuring 8.3x 6cm with large left subcapsular left perinephric hematoma with extension into the retrorenal space. Small focus of venous active extravasation. He also has recurrent prostate cancer receiving daily beam radiation.        -Hg pending today, Hb 8.7 yesterday afternoon  - Patient currently hemodynamically stable with normal blood pressure.   -Early castrate resistant recurrent prostate cancer. He is on androgen depravation therapy (next Eligard due after 12/31/2021) and  receiving daily beam radiation in the outpatient setting, per medicine will hold radiation at this time  -Recommendations for Xeralto discussed with Dr. Cazares, recommends holding medication for 2 weeks upon hemoglobin stabilization.  - Patient will need outpatient MRI for further evaluation of his renal mass prior to follow up with Dr. Cazares . This will need to be open MRI due to body habbitus  -From urologic standpoint, patient will be ok for discharge when hemoglobin remains stable and pain managed.   - Will follow up Hb this morning and determine if stable for discharge today        Nayeli Conrad PA-C  [facility]   [phone]

## 2021-12-18 NOTE — PLAN OF CARE
Problem: Adult Inpatient Plan of Care  Goal: Absence of Hospital-Acquired Illness or Injury  Intervention: Identify and Manage Fall Risk  Recent Flowsheet Documentation  Taken 12/18/2021 0030 by Serene Johnson, RN  Safety Promotion/Fall Prevention:    lighting adjusted    nonskid shoes/slippers when out of bed   Pt independent with activity from bed to chair.    CPAP on throughout night.  Denies pain at rest.  A-fib on telemetry    Serene Johnson, RN

## 2021-12-19 RX ORDER — LOSARTAN POTASSIUM 50 MG/1
50 TABLET ORAL DAILY
Qty: 30 TABLET | Refills: 0 | Status: SHIPPED | OUTPATIENT
Start: 2021-12-19

## 2021-12-20 LAB
BACTERIA BLD CULT: NO GROWTH
BACTERIA BLD CULT: NO GROWTH

## 2021-12-29 ENCOUNTER — TRANSFERRED RECORDS (OUTPATIENT)
Dept: HEALTH INFORMATION MANAGEMENT | Facility: CLINIC | Age: 71
End: 2021-12-29
Payer: COMMERCIAL

## 2022-01-10 ENCOUNTER — TRANSFERRED RECORDS (OUTPATIENT)
Dept: HEALTH INFORMATION MANAGEMENT | Facility: CLINIC | Age: 72
End: 2022-01-10
Payer: COMMERCIAL

## 2022-07-02 ENCOUNTER — HEALTH MAINTENANCE LETTER (OUTPATIENT)
Age: 72
End: 2022-07-02

## 2023-04-22 ENCOUNTER — HEALTH MAINTENANCE LETTER (OUTPATIENT)
Age: 73
End: 2023-04-22

## 2023-07-15 ENCOUNTER — HEALTH MAINTENANCE LETTER (OUTPATIENT)
Age: 73
End: 2023-07-15

## 2024-09-07 ENCOUNTER — HEALTH MAINTENANCE LETTER (OUTPATIENT)
Age: 74
End: 2024-09-07

## 2025-01-24 ENCOUNTER — HOSPITAL ENCOUNTER (EMERGENCY)
Facility: HOSPITAL | Age: 75
Discharge: HOME | End: 2025-01-24
Attending: EMERGENCY MEDICINE | Admitting: EMERGENCY MEDICINE
Payer: MEDICARE

## 2025-01-24 ENCOUNTER — APPOINTMENT (OUTPATIENT)
Dept: RADIOLOGY | Facility: HOSPITAL | Age: 75
End: 2025-01-24
Attending: EMERGENCY MEDICINE
Payer: MEDICARE

## 2025-01-24 ENCOUNTER — APPOINTMENT (OUTPATIENT)
Dept: CT IMAGING | Facility: HOSPITAL | Age: 75
End: 2025-01-24
Attending: EMERGENCY MEDICINE
Payer: MEDICARE

## 2025-01-24 VITALS
WEIGHT: 315 LBS | DIASTOLIC BLOOD PRESSURE: 68 MMHG | HEART RATE: 66 BPM | SYSTOLIC BLOOD PRESSURE: 146 MMHG | RESPIRATION RATE: 18 BRPM | BODY MASS INDEX: 49.07 KG/M2 | TEMPERATURE: 98 F | OXYGEN SATURATION: 98 %

## 2025-01-24 DIAGNOSIS — M79.644 THUMB PAIN, RIGHT: ICD-10-CM
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
## 2025-01-24 DIAGNOSIS — R07.81 RIB PAIN ON LEFT SIDE: ICD-10-CM

## 2025-01-24 DIAGNOSIS — S00.81XA FACIAL ABRASION, INITIAL ENCOUNTER: ICD-10-CM

## 2025-01-24 PROCEDURE — 72125 CT NECK SPINE W/O DYE: CPT

## 2025-01-24 PROCEDURE — 73140 X-RAY EXAM OF FINGER(S): CPT | Mod: RT

## 2025-01-24 PROCEDURE — 99284 EMERGENCY DEPT VISIT MOD MDM: CPT | Mod: 25

## 2025-01-24 PROCEDURE — 70450 CT HEAD/BRAIN W/O DYE: CPT

## 2025-01-24 PROCEDURE — 71101 X-RAY EXAM UNILAT RIBS/CHEST: CPT | Mod: LT

## 2025-01-24 PROCEDURE — 29125 APPL SHORT ARM SPLINT STATIC: CPT | Mod: F5

## 2025-01-24 PROCEDURE — 250N000013 HC RX MED GY IP 250 OP 250 PS 637: Performed by: EMERGENCY MEDICINE

## 2025-01-24 RX ORDER — LIDOCAINE 50 MG/G
1 PATCH TOPICAL EVERY 24 HOURS
Qty: 10 PATCH | Refills: 0 | Status: SHIPPED | OUTPATIENT
Start: 2025-01-24 | End: 2025-02-03

## 2025-01-24 RX ORDER — ACETAMINOPHEN 325 MG/1
650 TABLET ORAL ONCE
Status: COMPLETED | OUTPATIENT
Start: 2025-01-24 | End: 2025-01-24

## 2025-01-24 RX ADMIN — ACETAMINOPHEN 650 MG: 325 TABLET ORAL at 12:07

## 2025-01-24 ASSESSMENT — COLUMBIA-SUICIDE SEVERITY RATING SCALE - C-SSRS
2. HAVE YOU ACTUALLY HAD ANY THOUGHTS OF KILLING YOURSELF IN THE PAST MONTH?: NO
6. HAVE YOU EVER DONE ANYTHING, STARTED TO DO ANYTHING, OR PREPARED TO DO ANYTHING TO END YOUR LIFE?: NO
1. IN THE PAST MONTH, HAVE YOU WISHED YOU WERE DEAD OR WISHED YOU COULD GO TO SLEEP AND NOT WAKE UP?: NO

## 2025-01-24 ASSESSMENT — ACTIVITIES OF DAILY LIVING (ADL)
ADLS_ACUITY_SCORE: 53
ADLS_ACUITY_SCORE: 53

## 2025-01-24 NOTE — ED TRIAGE NOTES
Arrival MW ems from clinic, pt didn't see curb and fell hands first, hitting left forehead on ground. Denies loc , on thinners, Trauma alert called. Pt has abrasions to right thumb, left hands and knee and left forehead bleeding controlled.endorses pain to right thumb.       Triage Assessment (Adult)       Row Name 01/24/25 1031          Triage Assessment    Airway WDL WDL        Respiratory WDL    Respiratory WDL WDL        Peripheral/Neurovascular WDL    Peripheral Neurovascular WDL WDL

## 2025-01-24 NOTE — ED NOTES
Bed: JNEDH-I  Expected date: 1/24/25  Expected time: 10:15 AM  Means of arrival:   Comments:  74M, Fall, Hit Head, Thinners?

## 2025-01-24 NOTE — ED PROVIDER NOTES
EMERGENCY DEPARTMENT ENCOUNTER      NAME: Yovani Bauman  AGE: 74 year old male  YOB: 1950  MRN: [id number]  EVALUATION DATE & TIME: 1/24/2025 10:21 AM    PCP: Isidro Espinosa    ED PROVIDER: Jonathan Sam M.D.      Chief Complaint   Patient presents with    Fall         FINAL IMPRESSION:  1. Fall, initial encounter    2. Facial abrasion, initial encounter    3. Thumb pain, right    4. Rib pain on left side          ED COURSE & MEDICAL DECISION MAKING:    10:22 AM I met with the patient, obtained history, performed an initial exam, and discussed options and plan for diagnostics and treatment here in the ED.  11:21 AM Per nursing reports the patient now endorses right-sided rib pain.  12:21 PM Repeat exam benign.  Patient's pain improved.  Discussed findings and discharge close follow-up.    Pertinent Labs & Imaging studies reviewed. (See chart for details)  74 year old male presents to the Emergency Department for evaluation of fall. Patient appears non toxic with stable vitals signs, patient is afebrile with no tachycardia or hypoxia, no increased work of breathing. Overall exam is benign.  Per review of the medical record, did review office visit through [facility] general practice on 10/10/2024, patient noted to have history of weakness at baseline, chronic bilateral low back pain without sciatica.  Patient endorses missing the curb on the sidewalk, sounds as though this was a mechanical fall, denies any true lightheadedness, presyncopal symptoms, chest pain, vertigo, true room spinning, he has a GCS of 15 with no focal neurodeficits.  Considered labs, ECG however this sounds to be a mechanical fall and no indication for emergent labs or ECG at this time.  Clinically nothing to suggest intrathoracic or intra-abdominal trauma, considered x-ray of the left knee given the abrasion but the patient has full range of motion, no bony tenderness, offered but he deferred  x-ray of the knee.  We will obtain plain films of the right thumb as well as CT imaging of the head and C-spine.  Patient was offered but deferred pain medication.  Per review of the medical record, did review office visit on 8/5/2020 for [facility], patient noted to have a history of chronic atrial fibrillation on Xarelto.    Reassessment: Plain films of the ribs by my independent interpretation showed no focal consolidation and by report showed no new acute displaced fractures.  Plain films of the thumb reported normal alignment, I did not appreciate any acute fracture on my interpretation, did report small cortical bone fragment versus summation artifact volar to the first metacarpal, this is where patient is having pain so he will be placed in a thumb spica splint and instructed follow-up with [facility] in the next 5 to 7 days for repeat evaluation and possible repeat imaging.  CT imaging returned and reported no acute concerning findings.  Repeat exam benign patient continues to feel well here with our interventions.  Feel he can be safely discharged at this time with a prescription of Lidoderm patches, again the thumb spica splint and close follow-up with Ortho, recommend conservative management with acetaminophen.  Discussed all these findings recommendations with patient and spouse and they felt reassured comfortable discharge.  Return precautions provided.    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  Consultation discussion with other provider:Did you involve another provider (consultant, MH, pharmacy, etc.)?: No  Discharge. I prescribed additional prescription strength medication(s) as charted. I considered admission, but discharged patient after significant clinical improvement.    MIPS: Adult Minor Head Trauma:Age 65 years or older        At the conclusion of the encounter I discussed the results of all of the tests and the disposition. The questions were answered and return precautions provided. The patient or family acknowledged understanding and was agreeable with the care plan.         MEDICATIONS GIVEN IN THE EMERGENCY:  Medications   acetaminophen (TYLENOL) tablet 650 mg (650 mg Oral $Given 1/24/25 1207)       NEW PRESCRIPTIONS STARTED AT TODAY'S ER VISIT  Discharge Medication List as of 1/24/2025 12:39 PM        START taking these medications    Details   lidocaine (LIDODERM) 5 % patch Place 1 patch over 12 hours onto the skin every 24 hours for 10 days.Disp-10 patch, R-0Local Print                  =================================================================    HPI    Patient information was obtained from: Patient    Use of Intrepreter: N/A        Yovani Bauman is a 74 year old male who presents following a mechanical fall that occurred this morning.    Patient reports he had an appointment at the [facility] across the street from this hospital and he was exiting the building after the end of his appointment. As he was ambulating outside, he went to step on a curb, but didn't realize it was a curb, lost his balance, and subsequently fell to the ground. He landed on his left side. States he did hit his head, but denies losing consciousness. He denies any episodes of emesis after this fall. He takes Xarelto because he has a history of A-fib. He endorses pain in his right  thumb, especially when he bends the thumb. He has mild pain in his left knee and notes he scrapped this knee during the fall. He denies having any pain in his right knee with bending the knee. Mentions he has a history of skin cancer. Also denies having any upper extremity pain, spinal pain or other reported complaints or concerns at this time.      VITALS:  Patient Vitals for the past 24 hrs:   BP Temp Temp src Pulse Resp SpO2 Weight   01/24/25 1252 (!) 146/68 -- -- 66 18 98 % --   01/24/25 1118 (!) 149/69 -- -- 69 -- 97 % --   01/24/25 1030 (!) 151/78 98  F (36.7  C) Oral 65 18 98 % (!) 155.1 kg (342 lb)        PHYSICAL EXAM    Constitutional:  Awake, alert, in no apparent distress  HENT: Large abrasion over the left forehead, no palpable skull depressions or scalp hematomas. Bilateral external ears normal. Oropharynx moist. Nose normal. Neck- Normal range of motion with no guarding, No midline cervical tenderness, Supple, No stridor.   Eyes:  PERRL, EOMI with no signs of entrapment, Conjunctiva normal, No discharge.   Respiratory:  Normal breath sounds, No respiratory distress, No wheezing.    Cardiovascular:  Normal heart rate, Normal rhythm, No appreciable rubs or gallops.   GI:  Soft, No tenderness, No distension, No palpable masses  Musculoskeletal:  Intact distal pulses, No edema.  No gross deformities, scattered abrasions to upper extremities bilaterally, left knee, good range of motion at the left knee with no focal bony tenderness.  Tenderness to palpation over the right thumb.  Integument:  Warm, Dry, No erythema, No rash.   Neurologic:  Alert & oriented, Normal motor function, Normal sensory function, No focal deficits noted.   Psychiatric:  Affect normal, Judgment normal, Mood normal.     LAB:  All pertinent labs reviewed and interpreted.  Results for orders placed or performed during the hospital encounter of 01/24/25   CT Head w/o Contrast    Impression    IMPRESSION:  1.  No acute intracranial  process.  2.  Stable mild to moderate chronic small vessel ischemic disease and generalized brain parenchymal volume loss since 11/22/2024.   CT Cervical Spine w/o Contrast    Impression    IMPRESSION:  1.  No fracture or subluxation of the cervical spine.  2.  Multilevel cervical spondylosis, diffuse idiopathic skeletal hyperostosis and ossification of the posterior longitudinal ligament.   XR Finger Right 2 Views    Impression    IMPRESSION: Normal alignment. Small cortical bone fragment versus summation artifact volar to the first metacarpal head on the lateral view of the thumb. 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     Ribs XR, unilat 3 views + PA chest,  left    Impression    IMPRESSION: No focal consolidation. No pneumothorax. Possible small pleural effusions. Chronic left lateral rib fractures similar to CT 01/23/2023. No acute displaced fracture.       RADIOLOGY:  Ribs XR, unilat 3 views + PA chest,  left   Final Result   IMPRESSION: No focal consolidation. No pneumothorax. Possible small pleural effusions. Chronic left lateral rib fractures similar to CT 01/23/2023. No acute displaced fracture.      XR Finger Right 2 Views   Final Result   IMPRESSION: Normal alignment. Small cortical bone fragment versus summation artifact volar to the first metacarpal head on the lateral view of the thumb. 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         CT Cervical Spine w/o Contrast   Final Result   IMPRESSION:   1.  No fracture or subluxation of the cervical spine.   2.  Multilevel cervical spondylosis, diffuse idiopathic skeletal hyperostosis and ossification of the posterior longitudinal ligament.      CT Head w/o Contrast   Final Result   IMPRESSION:   1.  No acute intracranial process.   2.  Stable mild to moderate chronic small vessel ischemic disease and generalized brain parenchymal volume loss since 11/22/2024.             EKG:      I have independently reviewed and  interpreted the EKG(s) documented above.    PROCEDURES:        [facility] Documentation:   CMS Diagnoses:       I, Mar Forte, am serving as a scribe to document services personally performed by Jonathan Sam MD, based on my observation and the provider's statements to me. I, Jonathan Sam MD attest that Mar Forte is acting in a scribe capacity, has observed my performance of the services and has documented them in accordance with my direction.    Jonathan Sam M.D.  Emergency Medicine  [facility] EMERGENCY DEPARTMENT  [facility] [facility] [address]  [phone]  Dept: [phone]      Jonathan Sam MD  01/24/25 6663

## 2025-01-24 NOTE — DISCHARGE INSTRUCTIONS
Use the Lidoderm patches as needed as discussed.  You may continue to use Tylenol at home for pain.  Use the thumb spica splint as discussed and follow-up with [facility] in 1 week.  Please return for any worsening or more concerning symptoms.

## 2025-03-08 ENCOUNTER — HEALTH MAINTENANCE LETTER (OUTPATIENT)
Age: 75
End: 2025-03-08

## 2025-05-11 ENCOUNTER — HEALTH MAINTENANCE LETTER (OUTPATIENT)
Age: 75
End: 2025-05-11

## 2025-07-22 ENCOUNTER — ANESTHESIA EVENT (OUTPATIENT)
Dept: SURGERY | Facility: HOSPITAL | Age: 75
End: 2025-07-22
Payer: MEDICARE

## 2025-07-22 ENCOUNTER — HOSPITAL ENCOUNTER (INPATIENT)
Facility: HOSPITAL | Age: 75
LOS: 2 days | Discharge: HOME | End: 2025-07-24
Attending: EMERGENCY MEDICINE | Admitting: HOSPITALIST
Payer: MEDICARE

## 2025-07-22 ENCOUNTER — APPOINTMENT (OUTPATIENT)
Dept: CT IMAGING | Facility: HOSPITAL | Age: 75
DRG: 394 | End: 2025-07-22
Attending: EMERGENCY MEDICINE
Payer: MEDICARE

## 2025-07-22 DIAGNOSIS — E66.01 MORBID OBESITY (H): ICD-10-CM

## 2025-07-22 DIAGNOSIS — K62.5 BRBPR (BRIGHT RED BLOOD PER RECTUM): ICD-10-CM

## 2025-07-22 DIAGNOSIS — N28.89 RENAL MASS: ICD-10-CM

## 2025-07-22 DIAGNOSIS — I48.3 TYPICAL ATRIAL FLUTTER (H): ICD-10-CM

## 2025-07-22 DIAGNOSIS — K59.01 SLOW TRANSIT CONSTIPATION: ICD-10-CM

## 2025-07-22 DIAGNOSIS — M62.81 GENERALIZED MUSCLE WEAKNESS: ICD-10-CM

## 2025-07-22 DIAGNOSIS — E87.6 HYPOKALEMIA: Primary | ICD-10-CM

## 2025-07-22 LAB
ABO + RH BLD: NORMAL
ALBUMIN SERPL BCG-MCNC: 3.8 G/DL (ref 3.5–5.2)
ALBUMIN UR-MCNC: NEGATIVE MG/DL
ALP SERPL-CCNC: 176 U/L (ref 40–150)
ALT SERPL W P-5'-P-CCNC: 27 U/L (ref 0–70)
ANION GAP SERPL CALCULATED.3IONS-SCNC: 11 MMOL/L (ref 7–15)
APPEARANCE UR: CLEAR
APTT PPP: 40 SECONDS (ref 22–38)
AST SERPL W P-5'-P-CCNC: 24 U/L (ref 0–45)
BASOPHILS # BLD AUTO: 0.1 10E3/UL (ref 0–0.2)
BASOPHILS NFR BLD AUTO: 1 %
BILIRUB DIRECT SERPL-MCNC: 0.31 MG/DL (ref 0–0.3)
BILIRUB SERPL-MCNC: 0.8 MG/DL
BILIRUB UR QL STRIP: NEGATIVE
BLD GP AB SCN SERPL QL: NEGATIVE
BUN SERPL-MCNC: 23.5 MG/DL (ref 8–23)
CALCIUM SERPL-MCNC: 10 MG/DL (ref 8.8–10.4)
CHLORIDE SERPL-SCNC: 101 MMOL/L (ref 98–107)
COLOR UR AUTO: COLORLESS
CREAT SERPL-MCNC: 1.46 MG/DL (ref 0.67–1.17)
EGFRCR SERPLBLD CKD-EPI 2021: 51 ML/MIN/1.73M2
EOSINOPHIL # BLD AUTO: 0.1 10E3/UL (ref 0–0.7)
EOSINOPHIL NFR BLD AUTO: 1 %
ERYTHROCYTE [DISTWIDTH] IN BLOOD BY AUTOMATED COUNT: 16.1 % (ref 10–15)
EST. AVERAGE GLUCOSE BLD GHB EST-MCNC: 123 MG/DL
GLUCOSE BLDC GLUCOMTR-MCNC: 101 MG/DL (ref 70–99)
GLUCOSE BLDC GLUCOMTR-MCNC: 108 MG/DL (ref 70–99)
GLUCOSE BLDC GLUCOMTR-MCNC: 97 MG/DL (ref 70–99)
GLUCOSE SERPL-MCNC: 117 MG/DL (ref 70–99)
GLUCOSE UR STRIP-MCNC: NEGATIVE MG/DL
HBA1C MFR BLD: 5.9 %
HCO3 SERPL-SCNC: 27 MMOL/L (ref 22–29)
HCT VFR BLD AUTO: 38.1 % (ref 40–53)
HGB BLD-MCNC: 12.2 G/DL (ref 13.3–17.7)
HGB UR QL STRIP: NEGATIVE
HOLD SPECIMEN: NORMAL
IMM GRANULOCYTES # BLD: 0.1 10E3/UL
IMM GRANULOCYTES NFR BLD: 0 %
INR PPP: 3.27 (ref 0.85–1.15)
KETONES UR STRIP-MCNC: NEGATIVE MG/DL
LEUKOCYTE ESTERASE UR QL STRIP: NEGATIVE
LYMPHOCYTES # BLD AUTO: 0.8 10E3/UL (ref 0.8–5.3)
LYMPHOCYTES NFR BLD AUTO: 6 %
MCH RBC QN AUTO: 26.5 PG (ref 26.5–33)
MCHC RBC AUTO-ENTMCNC: 32 G/DL (ref 31.5–36.5)
MCV RBC AUTO: 83 FL (ref 78–100)
MONOCYTES # BLD AUTO: 0.8 10E3/UL (ref 0–1.3)
MONOCYTES NFR BLD AUTO: 6 %
NEUTROPHILS # BLD AUTO: 11.4 10E3/UL (ref 1.6–8.3)
NEUTROPHILS NFR BLD AUTO: 86 %
NITRATE UR QL: NEGATIVE
NRBC # BLD AUTO: 0 10E3/UL
NRBC BLD AUTO-RTO: 0 /100
PH UR STRIP: 6 [PH] (ref 5–7)
PLATELET # BLD AUTO: 262 10E3/UL (ref 150–450)
POTASSIUM SERPL-SCNC: 3.5 MMOL/L (ref 3.4–5.3)
PROT SERPL-MCNC: 6.7 G/DL (ref 6.4–8.3)
PROTHROMBIN TIME: 33.1 SECONDS (ref 11.8–14.8)
RBC # BLD AUTO: 4.6 10E6/UL (ref 4.4–5.9)
RBC URINE: <1 /HPF
SODIUM SERPL-SCNC: 139 MMOL/L (ref 135–145)
SP GR UR STRIP: 1.01 (ref 1–1.03)
SPECIMEN EXP DATE BLD: NORMAL
SQUAMOUS EPITHELIAL: <1 /HPF
UROBILINOGEN UR STRIP-MCNC: NORMAL MG/DL
WBC # BLD AUTO: 13.3 10E3/UL (ref 4–11)
WBC URINE: 1 /HPF

## 2025-07-22 PROCEDURE — 258N000003 HC RX IP 258 OP 636: Performed by: PHYSICIAN ASSISTANT

## 2025-07-22 PROCEDURE — 82435 ASSAY OF BLOOD CHLORIDE: CPT | Performed by: EMERGENCY MEDICINE

## 2025-07-22 PROCEDURE — 120N000001 HC R&B MED SURG/OB

## 2025-07-22 PROCEDURE — 85730 THROMBOPLASTIN TIME PARTIAL: CPT | Performed by: EMERGENCY MEDICINE

## 2025-07-22 PROCEDURE — 82962 GLUCOSE BLOOD TEST: CPT

## 2025-07-22 PROCEDURE — 999N000157 HC STATISTIC RCP TIME EA 10 MIN

## 2025-07-22 PROCEDURE — 74174 CTA ABD&PLVS W/CONTRAST: CPT

## 2025-07-22 PROCEDURE — 85025 COMPLETE CBC W/AUTO DIFF WBC: CPT | Performed by: EMERGENCY MEDICINE

## 2025-07-22 PROCEDURE — 250N000013 HC RX MED GY IP 250 OP 250 PS 637: Performed by: PHYSICIAN ASSISTANT

## 2025-07-22 PROCEDURE — 250N000011 HC RX IP 250 OP 636: Performed by: STUDENT IN AN ORGANIZED HEALTH CARE EDUCATION/TRAINING PROGRAM

## 2025-07-22 PROCEDURE — 86850 RBC ANTIBODY SCREEN: CPT | Performed by: EMERGENCY MEDICINE

## 2025-07-22 PROCEDURE — 99207 PR APP CREDIT; MD BILLING SHARED VISIT: CPT | Mod: FS | Performed by: STUDENT IN AN ORGANIZED HEALTH CARE EDUCATION/TRAINING PROGRAM

## 2025-07-22 PROCEDURE — 82040 ASSAY OF SERUM ALBUMIN: CPT | Performed by: STUDENT IN AN ORGANIZED HEALTH CARE EDUCATION/TRAINING PROGRAM

## 2025-07-22 PROCEDURE — 83036 HEMOGLOBIN GLYCOSYLATED A1C: CPT | Performed by: STUDENT IN AN ORGANIZED HEALTH CARE EDUCATION/TRAINING PROGRAM

## 2025-07-22 PROCEDURE — 250N000011 HC RX IP 250 OP 636: Performed by: EMERGENCY MEDICINE

## 2025-07-22 PROCEDURE — 36415 COLL VENOUS BLD VENIPUNCTURE: CPT | Performed by: EMERGENCY MEDICINE

## 2025-07-22 PROCEDURE — 250N000011 HC RX IP 250 OP 636: Performed by: PHYSICIAN ASSISTANT

## 2025-07-22 PROCEDURE — 81003 URINALYSIS AUTO W/O SCOPE: CPT | Performed by: STUDENT IN AN ORGANIZED HEALTH CARE EDUCATION/TRAINING PROGRAM

## 2025-07-22 PROCEDURE — 85610 PROTHROMBIN TIME: CPT | Performed by: EMERGENCY MEDICINE

## 2025-07-22 PROCEDURE — 99285 EMERGENCY DEPT VISIT HI MDM: CPT | Mod: 25 | Performed by: EMERGENCY MEDICINE

## 2025-07-22 PROCEDURE — 250N000013 HC RX MED GY IP 250 OP 250 PS 637: Performed by: STUDENT IN AN ORGANIZED HEALTH CARE EDUCATION/TRAINING PROGRAM

## 2025-07-22 RX ORDER — TIRZEPATIDE 5 MG/.5ML
5 INJECTION, SOLUTION SUBCUTANEOUS WEEKLY
COMMUNITY

## 2025-07-22 RX ORDER — AMOXICILLIN 250 MG
1 CAPSULE ORAL 2 TIMES DAILY PRN
Status: DISCONTINUED | OUTPATIENT
Start: 2025-07-22 | End: 2025-07-24 | Stop reason: HOSPADM

## 2025-07-22 RX ORDER — ACETAMINOPHEN 650 MG/1
650 SUPPOSITORY RECTAL EVERY 4 HOURS PRN
Status: DISCONTINUED | OUTPATIENT
Start: 2025-07-22 | End: 2025-07-24 | Stop reason: HOSPADM

## 2025-07-22 RX ORDER — HYDROCHLOROTHIAZIDE 25 MG/1
25 TABLET ORAL DAILY
Status: DISCONTINUED | OUTPATIENT
Start: 2025-07-23 | End: 2025-07-24 | Stop reason: HOSPADM

## 2025-07-22 RX ORDER — PREDNISONE 10 MG/1
10 TABLET ORAL DAILY
Status: DISCONTINUED | OUTPATIENT
Start: 2025-07-23 | End: 2025-07-22

## 2025-07-22 RX ORDER — VENLAFAXINE HYDROCHLORIDE 150 MG/1
150 CAPSULE, EXTENDED RELEASE ORAL DAILY
COMMUNITY
Start: 2025-07-22

## 2025-07-22 RX ORDER — ACETAMINOPHEN 325 MG/1
650 TABLET ORAL EVERY 4 HOURS PRN
Status: DISCONTINUED | OUTPATIENT
Start: 2025-07-22 | End: 2025-07-24 | Stop reason: HOSPADM

## 2025-07-22 RX ORDER — ABIRATERONE ACETATE 250 MG/1
500 TABLET ORAL DAILY
Status: DISCONTINUED | OUTPATIENT
Start: 2025-07-22 | End: 2025-07-24 | Stop reason: HOSPADM

## 2025-07-22 RX ORDER — DEXTROSE MONOHYDRATE 25 G/50ML
25-50 INJECTION, SOLUTION INTRAVENOUS
Status: DISCONTINUED | OUTPATIENT
Start: 2025-07-22 | End: 2025-07-24 | Stop reason: HOSPADM

## 2025-07-22 RX ORDER — BUPROPION HYDROCHLORIDE 150 MG/1
150 TABLET ORAL EVERY MORNING
Status: DISCONTINUED | OUTPATIENT
Start: 2025-07-23 | End: 2025-07-24 | Stop reason: HOSPADM

## 2025-07-22 RX ORDER — METOPROLOL SUCCINATE 25 MG/1
25 TABLET, EXTENDED RELEASE ORAL DAILY
COMMUNITY
Start: 2024-12-13

## 2025-07-22 RX ORDER — RIVAROXABAN 20 MG/1
20 TABLET, FILM COATED ORAL
Status: ON HOLD | COMMUNITY
Start: 2024-03-01 | End: 2025-07-24

## 2025-07-22 RX ORDER — AMOXICILLIN 250 MG
2 CAPSULE ORAL 2 TIMES DAILY PRN
Status: DISCONTINUED | OUTPATIENT
Start: 2025-07-22 | End: 2025-07-24 | Stop reason: HOSPADM

## 2025-07-22 RX ORDER — FERROUS SULFATE 325(65) MG
325 TABLET ORAL
COMMUNITY
Start: 2024-04-05

## 2025-07-22 RX ORDER — ONDANSETRON 4 MG/1
4 TABLET, ORALLY DISINTEGRATING ORAL EVERY 6 HOURS PRN
Status: DISCONTINUED | OUTPATIENT
Start: 2025-07-22 | End: 2025-07-24 | Stop reason: HOSPADM

## 2025-07-22 RX ORDER — HYDROCHLOROTHIAZIDE 25 MG/1
25 TABLET ORAL DAILY
COMMUNITY
Start: 2024-02-05

## 2025-07-22 RX ORDER — PREDNISONE 5 MG/1
5 TABLET ORAL DAILY
COMMUNITY

## 2025-07-22 RX ORDER — NICOTINE POLACRILEX 4 MG
15-30 LOZENGE BUCCAL
Status: DISCONTINUED | OUTPATIENT
Start: 2025-07-22 | End: 2025-07-24 | Stop reason: HOSPADM

## 2025-07-22 RX ORDER — ONDANSETRON 2 MG/ML
4 INJECTION INTRAMUSCULAR; INTRAVENOUS EVERY 6 HOURS PRN
Status: DISCONTINUED | OUTPATIENT
Start: 2025-07-22 | End: 2025-07-24 | Stop reason: HOSPADM

## 2025-07-22 RX ORDER — LOSARTAN POTASSIUM 100 MG/1
1 TABLET ORAL DAILY
COMMUNITY
Start: 2025-05-06

## 2025-07-22 RX ORDER — DILTIAZEM HYDROCHLORIDE 420 MG/1
420 CAPSULE, EXTENDED RELEASE ORAL DAILY
COMMUNITY
Start: 2025-06-24

## 2025-07-22 RX ORDER — ATORVASTATIN CALCIUM 10 MG/1
20 TABLET, FILM COATED ORAL AT BEDTIME
Status: DISCONTINUED | OUTPATIENT
Start: 2025-07-22 | End: 2025-07-24 | Stop reason: HOSPADM

## 2025-07-22 RX ORDER — IOPAMIDOL 755 MG/ML
90 INJECTION, SOLUTION INTRAVASCULAR ONCE
Status: COMPLETED | OUTPATIENT
Start: 2025-07-22 | End: 2025-07-22

## 2025-07-22 RX ORDER — POTASSIUM CHLORIDE 750 MG/1
10 TABLET, EXTENDED RELEASE ORAL DAILY
Status: DISCONTINUED | OUTPATIENT
Start: 2025-07-23 | End: 2025-07-24 | Stop reason: HOSPADM

## 2025-07-22 RX ORDER — ABIRATERONE ACETATE 250 MG/1
500 TABLET ORAL DAILY
COMMUNITY

## 2025-07-22 RX ORDER — LIDOCAINE 40 MG/G
CREAM TOPICAL
Status: DISCONTINUED | OUTPATIENT
Start: 2025-07-22 | End: 2025-07-24 | Stop reason: HOSPADM

## 2025-07-22 RX ORDER — POTASSIUM CHLORIDE 750 MG/1
10 TABLET, EXTENDED RELEASE ORAL DAILY
Status: ON HOLD | COMMUNITY
Start: 2024-08-16 | End: 2025-07-24

## 2025-07-22 RX ORDER — BUPROPION HYDROCHLORIDE 150 MG/1
150 TABLET ORAL EVERY MORNING
COMMUNITY
Start: 2025-05-30

## 2025-07-22 RX ORDER — METOPROLOL SUCCINATE 25 MG/1
25 TABLET, EXTENDED RELEASE ORAL DAILY
Status: DISCONTINUED | OUTPATIENT
Start: 2025-07-23 | End: 2025-07-24 | Stop reason: HOSPADM

## 2025-07-22 RX ORDER — PREDNISONE 5 MG/1
5 TABLET ORAL DAILY
Status: DISCONTINUED | OUTPATIENT
Start: 2025-07-23 | End: 2025-07-24 | Stop reason: HOSPADM

## 2025-07-22 RX ORDER — LOSARTAN POTASSIUM 50 MG/1
100 TABLET ORAL DAILY
Status: DISCONTINUED | OUTPATIENT
Start: 2025-07-23 | End: 2025-07-24 | Stop reason: HOSPADM

## 2025-07-22 RX ORDER — CALCIUM CARBONATE 500 MG/1
1000 TABLET, CHEWABLE ORAL 4 TIMES DAILY PRN
Status: DISCONTINUED | OUTPATIENT
Start: 2025-07-22 | End: 2025-07-24 | Stop reason: HOSPADM

## 2025-07-22 RX ORDER — FERROUS SULFATE 325(65) MG
325 TABLET ORAL DAILY
Status: DISCONTINUED | OUTPATIENT
Start: 2025-07-23 | End: 2025-07-24 | Stop reason: HOSPADM

## 2025-07-22 RX ORDER — VENLAFAXINE HYDROCHLORIDE 75 MG/1
150 CAPSULE, EXTENDED RELEASE ORAL DAILY
Status: DISCONTINUED | OUTPATIENT
Start: 2025-07-23 | End: 2025-07-24 | Stop reason: HOSPADM

## 2025-07-22 RX ADMIN — ATORVASTATIN CALCIUM 20 MG: 10 TABLET, FILM COATED ORAL at 21:34

## 2025-07-22 RX ADMIN — PHYTONADIONE 5 MG: 10 INJECTION, EMULSION INTRAMUSCULAR; INTRAVENOUS; SUBCUTANEOUS at 16:41

## 2025-07-22 RX ADMIN — IOPAMIDOL 90 ML: 755 INJECTION, SOLUTION INTRAVENOUS at 12:33

## 2025-07-22 RX ADMIN — PANTOPRAZOLE SODIUM 40 MG: 40 INJECTION, POWDER, FOR SOLUTION INTRAVENOUS at 15:31

## 2025-07-22 RX ADMIN — DILTIAZEM HYDROCHLORIDE 420 MG: 180 CAPSULE, EXTENDED RELEASE ORAL at 21:35

## 2025-07-22 RX ADMIN — POLYETHYLENE GLYCOL 3350, SODIUM SULFATE ANHYDROUS, SODIUM BICARBONATE, SODIUM CHLORIDE, POTASSIUM CHLORIDE 4000 ML: 236; 22.74; 6.74; 5.86; 2.97 POWDER, FOR SOLUTION ORAL at 18:07

## 2025-07-22 ASSESSMENT — COLUMBIA-SUICIDE SEVERITY RATING SCALE - C-SSRS
1. IN THE PAST MONTH, HAVE YOU WISHED YOU WERE DEAD OR WISHED YOU COULD GO TO SLEEP AND NOT WAKE UP?: NO
6. HAVE YOU EVER DONE ANYTHING, STARTED TO DO ANYTHING, OR PREPARED TO DO ANYTHING TO END YOUR LIFE?: NO
2. HAVE YOU ACTUALLY HAD ANY THOUGHTS OF KILLING YOURSELF IN THE PAST MONTH?: NO

## 2025-07-22 ASSESSMENT — ACTIVITIES OF DAILY LIVING (ADL)
ADLS_ACUITY_SCORE: 61
ADLS_ACUITY_SCORE: 53
ADLS_ACUITY_SCORE: 55
ADLS_ACUITY_SCORE: 53
ADLS_ACUITY_SCORE: 55
ADLS_ACUITY_SCORE: 53

## 2025-07-22 NOTE — MEDICATION SCRIBE - ADMISSION MEDICATION HISTORY
Medication Scribe Admission Medication History    Admission medication history is complete. The information provided in this note is only as accurate as the sources available at the time of the update.    Information Source(s): Patient and Family member via in-person    Pertinent Information: patients wife, Logan, manages medications. Wife able to verify med list, allergies and last dosing. Wife will bring in evening medications including ABIRATERONE.     Changes made to PTA medication list:  Added: Iron, Abiraterone, hydrochlorothiazide, metoprolol, potassium, prednisone, xarelto, mounjaro  Deleted: None  Changed: Bupropion to 150mg, losartan to 100mg, venlafaxine to 150mg, prednisone to 5mg     Allergies reviewed with patient and updates made in EHR: yes    Medication History Completed By: Panda Buckley 7/22/2025 1:42 PM    PTA Med List   Medication Sig Note Last Dose/Taking    abiraterone (ZYTIGA) 250 MG tablet Take 500 mg by mouth daily.  7/21/2025 Noon    atorvastatin (LIPITOR) 20 MG tablet Take 20 mg by mouth At Bedtime   7/21/2025 Bedtime    buPROPion (WELLBUTRIN XL) 150 MG 24 hr tablet Take 150 mg by mouth every morning.  7/22/2025 Morning    ferrous sulfate (FEROSUL) 325 (65 Fe) MG tablet Take 325 mg by mouth daily with food.  7/22/2025 Morning    hydrochlorothiazide (HYDRODIURIL) 25 MG tablet Take 25 mg by mouth daily.  7/22/2025 Morning    leuprolide, 3 Month, (ELIGARD) 22.5 MG Inject 22.5 mg Subcutaneous every 3 months 7/22/2025: Due 7-28 Taking    losartan (COZAAR) 100 MG tablet Take 1 tablet by mouth daily.  7/22/2025 Morning    metoprolol succinate ER (TOPROL XL) 25 MG 24 hr tablet Take 25 mg by mouth daily.  7/22/2025 Morning    MOUNJARO 5 MG/0.5ML SOAJ auto-injector pen Inject 5 mg subcutaneously once a week. Fridays  Taking    potassium chloride rhett ER (KLOR-CON M10) 10 MEQ CR tablet Take 10 mEq by mouth daily.  7/22/2025 Morning    predniSONE (DELTASONE) 5 MG tablet Take 5 mg by mouth daily.   7/22/2025 Morning    TIADYLT  MG 24 hr ER beaded capsule Take 420 mg by mouth daily.  7/21/2025 Evening    venlafaxine (EFFEXOR XR) 150 MG 24 hr capsule Take 150 mg by mouth daily.  7/22/2025 Morning    XARELTO ANTICOAGULANT 20 MG TABS tablet Take 20 mg by mouth daily (with dinner).  7/21/2025 Evening

## 2025-07-22 NOTE — ED TRIAGE NOTES
The pt arrives with several days of bright red rectal bleeding. He states that the blood is a constant trickle from his rectum. Denies pain in rectum or ABD. Is no a blood thinner.       Triage Assessment (Adult)       Row Name 07/22/25 1015          Triage Assessment    Airway WDL WDL        Cognitive/Neuro/Behavioral WDL    Cognitive/Neuro/Behavioral WDL WDL

## 2025-07-22 NOTE — ED PROVIDER NOTES
EMERGENCY DEPARTMENT ENCOUNTER      NAME: Yovani Bauman  AGE: 74 year old male  YOB: 1950  MRN: [id number]  EVALUATION DATE & TIME: 7/22/2025 10:28 AM    PCP: Shanda Jennings    ED PROVIDER: Ryan Pittman D.O.      Chief Complaint   Patient presents with    Rectal Bleeding       FINAL IMPRESSION:  1. BRBPR (bright red blood per rectum)        ED COURSE & MEDICAL DECISION MAKING:    10:37 AM I met with the patient to gather history and to perform my initial exam. I discussed the plan for care while in the Emergency Department.  12:46 PM I rechecked with and updated the patient on results.   2:32 PM I rechecked with and updated the patient on results. Patient refused transfer.    2:41 PM  I spoke to Dr. Jennifer Aiken MD, Hospitalist who accepts the patient for admission.   2:51 PM I spoke to Dr. Joe Posada MD, Gastroenterology.          Pertinent Labs & Imaging studies reviewed. (See chart for details)  74 year old male presents to the Emergency Department for evaluation of bright red blood per rectum.  Patient denies any abdominal pain.  Has had multiple days of filling up the toilet with blood.  And is currently on a blood thinner for atrial fibrillation.  Lab testing did not show any evidence of significant drop in his hemoglobin, nor was or definitive evidence of active GI bleed on CT imaging.  However based on age, the fact that he is on a blood thinner, and the fact that he has had multiple episodes of bright red blood per rectum per day over the past several days, I believe it would be prudent to admit the patient for further evaluation for GI bleed.  Discussed with the hospitalist and gastroenterology, patient be admitted for further management.    Medical Decision Making  Care impacted by anticoagulation due to atrial fibrillation  Admit.    MIPS (CTPE, Dental pain, Bell, Sinusitis, Asthma/COPD, Head Trauma): Not Applicable    SEPSIS: None          At the conclusion of the  encounter I discussed the results of all of the tests and the disposition. The questions were answered. The patient or family acknowledged understanding and was agreeable with the care plan.      HPI    Patient information was obtained from: patient.    Use of : N/A        Yovani Bauman is a 74 year old male with a pertinent medical history of excess anticoagulation,HLD, hyperglycemia, morbid obesity, atrial fibrillation, and PAF who presents to this ED for evaluation of rectal bleeding.    The patient reports that he has been having excessive rectal bleeding for the last several days. Notes that the blood is visible enough to fill the toilet bowl. He reports that he is anticoagulated on Xarelto for atrial fibrillation. He also reports of some dizziness on exertion, which wife reports is chronic. He denies any hemorrhoids, abdominal pain, chest pain, shortness of breath, fever, cough, congestion, dysuria, or hematuria. There were no other concerns/complaints at this time.         PAST MEDICAL HISTORY:  Past Medical History:   Diagnosis Date    ASCVD (arteriosclerotic cardiovascular disease)     Atrial flutter (H)     Depression     Edema     GERD (gastroesophageal reflux disease)     High triglycerides     HTN (hypertension)     Low vitamin B12 level     Metabolic syndrome     TRENTON on CPAP     CPAP    Prostate CA (H)        PAST SURGICAL HISTORY:  Past Surgical History:   Procedure Laterality Date    CHOLECYSTECTOMY      CRYOABLATION  07/2019    HYDROCELECTOMY SCROTAL      KNEE SURGERY      TX SURG DIAGNOSTIC EXAM, ANORECTAL N/A 12/26/2019    Procedure: EXAM UNDER ANESTHESIA WITH EXCISION OF ANAL SKIN TAG;  Surgeon: Lenny Ramsey MD;  Location: [facility] [place];  Service: General    PROSTATE SURGERY      VASECTOMY           CURRENT MEDICATIONS:    Current Facility-Administered Medications   Medication Dose Route Frequency Provider Last Rate Last Admin    pantoprazole (PROTONIX) injection 40 mg  40  mg Intravenous Once Ryan Pittman, DO         Current Outpatient Medications   Medication Sig Dispense Refill    abiraterone (ZYTIGA) 250 MG tablet Take 500 mg by mouth daily.      atorvastatin (LIPITOR) 20 MG tablet Take 20 mg by mouth At Bedtime       buPROPion (WELLBUTRIN XL) 150 MG 24 hr tablet Take 150 mg by mouth every morning.      ferrous sulfate (FEROSUL) 325 (65 Fe) MG tablet Take 325 mg by mouth daily with food.      hydrochlorothiazide (HYDRODIURIL) 25 MG tablet Take 25 mg by mouth daily.      leuprolide, 3 Month, (ELIGARD) 22.5 MG Inject 22.5 mg Subcutaneous every 3 months      losartan (COZAAR) 100 MG tablet Take 1 tablet by mouth daily.      metoprolol succinate ER (TOPROL XL) 25 MG 24 hr tablet Take 25 mg by mouth daily.      MOUNJARO 5 MG/0.5ML SOAJ auto-injector pen Inject 5 mg subcutaneously once a week. Fridays      potassium chloride rhett ER (KLOR-CON M10) 10 MEQ CR tablet Take 10 mEq by mouth daily.      predniSONE (DELTASONE) 10 MG tablet Take 10 mg by mouth daily.      TIADYLT  MG 24 hr ER beaded capsule Take 420 mg by mouth daily.      venlafaxine (EFFEXOR XR) 150 MG 24 hr capsule Take 150 mg by mouth daily.      XARELTO ANTICOAGULANT 20 MG TABS tablet Take 20 mg by mouth daily (with dinner).           ALLERGIES:  Allergies   Allergen Reactions    Other Environmental Allergy Unknown     Cockroaches, dust mites....the patient had allergy testing done and showed he might me allergic.  Never has had reaction    Shellfish Containing Products [Shellfish-Derived Products] Unknown     Pt did allergy testing and showed shellfish, pt has never had a reaction       FAMILY HISTORY:  Family History   Problem Relation Age of Onset    Coronary Artery Disease Mother     Coronary Artery Disease Father        SOCIAL HISTORY:  Social History     Socioeconomic History    Marital status:      Spouse name: None    Number of children: None    Years of education: None    Highest education level:  None   Tobacco Use    Smoking status: Never    Smokeless tobacco: Never   Substance and Sexual Activity    Alcohol use: Yes     Comment: Alcoholic Drinks/day: occassional    Drug use: No     Social Drivers of Health     Financial Resource Strain: Low Risk  (9/27/2024)    [facility]    Financial Resource Strain     Difficulty of Paying Living Expenses: 3   Food Insecurity: Patient Declined (2/1/2025)    Received from [facility]    Hunger Vital Sign     Worried About Running Out of Food in the Last Year: Patient declined     Ran Out of Food in the Last Year: Patient declined   Transportation Needs: Patient Declined (2/1/2025)    Received from [facility]    PRAPARE - Transportation     Lack of Transportation (Medical): Patient declined     Lack of Transportation (Non-Medical): Patient declined   Social Connections: Socially Integrated (9/27/2024)    [facility]    Social Connections     Do you often feel lonely or isolated from those around you?: 0   Interpersonal Safety: Patient Declined (2/1/2025)    Received from [facility]    Humiliation, Afraid, Rape, and Kick questionnaire     Fear of Current or Ex-Partner: Patient declined     Emotionally Abused: Patient declined     Physically Abused: Patient declined     Sexually Abused: Patient declined   Housing Stability: Patient Declined (2/1/2025)    Received from [facility]    Housing Stability Vital Sign     Unable to Pay for Housing in the Last Year: Patient declined     Homeless in the Last Year: Patient declined       VITALS:  Patient Vitals for the past 24 hrs:   BP Temp Temp src Pulse Resp SpO2 Weight   07/22/25 1239 131/79 -- -- 61 -- 98 % --   07/22/25 1028 -- -- -- -- -- -- (!) 142.7 kg (314 lb 8 oz)   07/22/25 1020 -- -- -- -- -- -- (!) 143.1 kg (315 lb 8 oz)   07/22/25 1014 126/67 98.2  F (36.8  C) Oral 60 18 96 % --       PHYSICAL EXAM    VITAL  SIGNS: /79   Pulse 61   Temp 98.2  F (36.8  C) (Oral)   Resp 18   Wt (!) 142.7 kg (314 lb 8 oz)   SpO2 98%   BMI 45.13 kg/m      General Appearance: Well-appearing, Obese, no acute distress   Head:  Normocephalic, without obvious abnormality, atraumatic  Eyes:  PERRL, conjunctiva/corneas clear, EOM's intact,  ENT:  Lips, mucosa, and tongue normal, membranes are moist without pallor  Neck:  Normal ROM, symmetrical, trachea midline    Cardio:  Regular rate and rhythm, no murmur, rub or gallop, 2+ pulses symmetric in all extremities  Pulm:  Clear to auscultation bilaterally, respirations unlabored,  Abdomen:  Soft, non-tender, no rebound or guarding.  Rectal: No evident hemorrhoid on external exam.    Musculoskeletal: Full ROM, no edema, no cyanosis, good ROM of major joints  Integument:  Warm, Dry, No erythema, No rash.    Neurologic:  Alert & oriented.  No focal deficits appreciated.    Psychiatric:  Affect normal, Judgment normal, Mood normal.      LABS  Recent Results (from the past 24 hours)   CBC with platelets + differential    Narrative    The following orders were created for panel order CBC with platelets + differential.  Procedure                               Abnormality         Status                     ---------                               -----------         ------                     CBC with platelets and ...[[id number]]  Abnormal            Final result                 Please view results for these tests on the individual orders.   Basic metabolic panel   Result Value Ref Range    Sodium 139 135 - 145 mmol/L    Potassium 3.5 3.4 - 5.3 mmol/L    Chloride 101 98 - 107 mmol/L    Carbon Dioxide (CO2) 27 22 - 29 mmol/L    Anion Gap 11 7 - 15 mmol/L    Urea Nitrogen 23.5 (H) 8.0 - 23.0 mg/dL    Creatinine 1.46 (H) 0.67 - 1.17 mg/dL    GFR Estimate 51 (L) >60 mL/min/1.73m2    Calcium 10.0 8.8 - 10.4 mg/dL    Glucose 117 (H) 70 - 99 mg/dL   INR   Result Value Ref Range    INR 3.27 (H) 0.85 -  1.15    PT 33.1 (H) 11.8 - 14.8 Seconds   PTT   Result Value Ref Range    aPTT 40 (H) 22 - 38 Seconds   ABO/Rh type and screen    Narrative    The following orders were created for panel order ABO/Rh type and screen.  Procedure                               Abnormality         Status                     ---------                               -----------         ------                     Adult Type and Screen[[id number]]                           Final result                 Please view results for these tests on the individual orders.   CBC with platelets and differential   Result Value Ref Range    WBC Count 13.3 (H) 4.0 - 11.0 10e3/uL    RBC Count 4.60 4.40 - 5.90 10e6/uL    Hemoglobin 12.2 (L) 13.3 - 17.7 g/dL    Hematocrit 38.1 (L) 40.0 - 53.0 %    MCV 83 78 - 100 fL    MCH 26.5 26.5 - 33.0 pg    MCHC 32.0 31.5 - 36.5 g/dL    RDW 16.1 (H) 10.0 - 15.0 %    Platelet Count 262 150 - 450 10e3/uL    % Neutrophils 86 %    % Lymphocytes 6 %    % Monocytes 6 %    % Eosinophils 1 %    % Basophils 1 %    % Immature Granulocytes 0 %    NRBCs per 100 WBC 0 <1 /100    Absolute Neutrophils 11.4 (H) 1.6 - 8.3 10e3/uL    Absolute Lymphocytes 0.8 0.8 - 5.3 10e3/uL    Absolute Monocytes 0.8 0.0 - 1.3 10e3/uL    Absolute Eosinophils 0.1 0.0 - 0.7 10e3/uL    Absolute Basophils 0.1 0.0 - 0.2 10e3/uL    Absolute Immature Granulocytes 0.1 <=0.4 10e3/uL    Absolute NRBCs 0.0 10e3/uL   Adult Type and Screen   Result Value Ref Range    ABO/RH(D) AB POS     Antibody Screen Negative Negative    SPECIMEN EXPIRATION DATE 7/25/2025 11:59:00 PM CDT    [facility]    Narrative    The following orders were created for panel order [facility].  Procedure                               Abnormality         Status                     ---------                               -----------         ------                     Extra Red Top Tube[[id number]]                              Final result                 Please view results for these tests on  the individual orders.   Extra Red Top Tube   Result Value Ref Range    Hold Specimen [facility]    CTA GI Bleed    Narrative    EXAM: CTA GI BLEED  LOCATION: [facility]  DATE: 7/22/2025    INDICATION: BRBPR  COMPARISON: CT abdomen and pelvis from 06/03/2024  TECHNIQUE: CT angiogram abdomen pelvis during arterial phase of injection of IV contrast. 2D and 3D MIP reconstructions were performed by the CT technologist. Dose reduction techniques were used.  CONTRAST: isovue 370 90ml    FINDINGS:  ANGIOGRAM ABDOMEN/PELVIS: There is no evidence of intraluminal contrast extravasation within the bowel to suggest active GI bleeding at this time. The aorta is widely patent and normal in caliber. The celiac, superior mesenteric, renal, and inferior   mesenteric arteries are patent without aneurysm or clinically significant stenosis. The bilateral iliac arteries are patent.    LOWER CHEST: There is mild atelectasis and/or scarring in the imaged lung bases.    HEPATOBILIARY: Hepatic morphology is normal. No hepatic surface nodularity. No significant hepatic steatosis. There are a few subcentimeter hypoattenuating foci that are too small to adequately characterize but are statistically favored to represent   small cysts. The gallbladder is absent.    PANCREAS: Normal.    SPLEEN: Unremarkable. Few small splenules.    ADRENAL GLANDS: Normal.    KIDNEYS/BLADDER: A fat-containing lesion in the right kidney measures approximately 3.7 cm (series 10 image 164) likely represents an angiomyolipoma, slightly increased in size compared to the prior study from 2024 measured 3.3 cm. Additionally, there   are multiple large right renal cysts measuring up to 12 cm, requiring no follow-up. An area of mixed fat attenuation and stranding along the posterior aspect of the left kidney measures approximately 4.9 cm (series 10 image 196), not significantly   changed in size compared to the prior exam, possibly representing an  additional angiomyolipoma, versus sequela of resolved prior left perinephric hematoma. A cyst along the upper pole left kidney measures 4.8 cm, requiring no follow-up. There is no   hydronephrosis. The urinary bladder is decompressed and not well evaluated.    BOWEL: No evidence of bowel obstruction. No significant focal bowel wall thickening.    LYMPH NODES: No lymphadenopathy.    PELVIC ORGANS: The prostate gland is absent, with multiple fiducial markers again seen in the prostatectomy bed.    MUSCULOSKELETAL: Multilevel degenerative changes throughout the imaged spine. Chronic appearing right-sided rib fractures.      Impression    IMPRESSION:  1.  No evidence of active GI bleeding at this time.  2.  Fat-containing lesion in the right kidney measuring up to 3.7 cm likely represents an angiomyolipoma, previously 3.3 cm on the prior study from 2024. An area of mixed fat attenuation and stranding along the posterior aspect of the left kidney   measures approximately 4.9 cm, not significantly changed in size compared to the prior exam, possibly representing an additional fat predominant angiomyolipoma, versus sequela of resolved prior left perinephric hematoma. Renal ultrasound could be   considered on a nonemergent basis for further characterization if clinically indicated.           RADIOLOGY  CTA GI Bleed   Final Result   IMPRESSION:   1.  No evidence of active GI bleeding at this time.   2.  Fat-containing lesion in the right kidney measuring up to 3.7 cm likely represents an angiomyolipoma, previously 3.3 cm on the prior study from 2024. An area of mixed fat attenuation and stranding along the posterior aspect of the left kidney    measures approximately 4.9 cm, not significantly changed in size compared to the prior exam, possibly representing an additional fat predominant angiomyolipoma, versus sequela of resolved prior left perinephric hematoma. Renal ultrasound could be    considered on a nonemergent basis  for further characterization if clinically indicated.             MEDICATIONS GIVEN IN THE EMERGENCY:  Medications   pantoprazole (PROTONIX) injection 40 mg (has no administration in time range)   iopamidol (ISOVUE-370) solution 90 mL (90 mLs Intravenous $Given 7/22/25 1233)       NEW PRESCRIPTIONS STARTED AT TODAY'S ER VISIT  New Prescriptions    No medications on file        I, Brenna Edge, am serving as a scribe to document services personally performed by Ryan Pittman D.O., based on my observations and the provider's statements to me.  I, Ryan Pittman D.O., attest that Brenna Edge is acting in a scribe capacity, has observed my performance of the services and has documented them in accordance with my direction.     Ryan Pittman D.O.  Emergency Medicine  [facility] EMERGENCY DEPARTMENT  [address] [place] [id number]  [phone]  Dept: [phone]       Ryan Pittman,   07/22/25 3822

## 2025-07-22 NOTE — ED NOTES
Patient and family in understanding about bowel prep plan. Patient tolerating clear liquid diet. Continent of bladder and bowel. Report given to SISSY Wells. Patient to transfer up to P214.

## 2025-07-22 NOTE — H&P
[facility]    History and Physical - Hospitalist Service       Date of Admission:  7/22/2025    Assessment & Plan      Yovani Bauman is a 74 year old male with PMHx significant for atrial fibrillation, prostate cancer, DM type II, hypertension, HLD, and anxiety.  He presented to the ER with multiple days of bright red blood per rectum.  Admitted for further care management    # Bright red blood per rectum   # Iron deficiency anemia, acute on chronic  Patient reports several days of bright red blood per rectum prompting admission.  On arrival, hemoglobin 12.2.   - GI consult, discussed cares at bedside.   - Plan for likely Colonoscopy and EGD tomorrow   - CLD and start prep today    - NPO at midnight   - HOLD Xarelto  - IV Protonix 40 mg daily   - Continue PTA iron supplement   - CBC and BMP in the AM     #Atrial fibrillation on anticoagulation   #Hypertension  #HLD  Patient had nuclear stress test on 6/19/2025 due to dyspnea which did not show any wall motion abnormalities or inducible ischemia. Echo from the same time shows EF 65%, biatrial enlargement   - Hold Xarelto due to GI bleed  - Continue PTA hydrochlorothiazide 5 mg daily, losartan 100 mg daily, metoprolol 25 mg daily, and Diltiazem  mg daily   - PTA atorvastatin 20 mg    # Chronic Cough   # Leukocytosis  WBC 13.3 on arrival. He is on chronic prednisone which could explain his.  However, he does report an ongoing cough for approximately 2 months with EVANS, which he had a stress test for (see below).  He is bringing up yellow sputum.  He did have a chest x-ray on 6/5 which showed no acute abnormality.  He denies urinary symptoms.  He denies known fevers during this time. He does not smoke.   - Consider CT chest as an outpatient     # Weakness, multiple falls   He reports multiple falls over the last several months.  Does feel little weaker since starting Mounjaro about 1 month ago, possible muscle wasting.  He is seeing  PT/OT as an outpatient.  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   - non-urgent follow up with renal US as outpatient     # DM type II  # Obesity   Takes Mounjaro injections weekly. He does report about 40 lbs weight loss since starting Mounjaro.  - Hold PTA Mounjaro  - Medium intensity sliding scale while inpatient  - 4 times daily and overnight blood sugar checks, hypoglycemic    # Prostate cancer  - PTA abiraterone  daily  - Leuprolide injection q 3 months (due 7/28)  - He is on prednisone 5 mg daily to prevent side effects from his chemo meds     #Anxiety  - PTA Wellbutrin 150 milligrams daily and Effexor 150 mg daily     #Severe TRENTON on CPAP  -CPAP        Diet: Clear Liquid Diet  NPO for Medical/Clinical Reasons Except for: Meds   DVT Prophylaxis: Pneumatic Compression Devices  Bell Catheter: Not present  Lines: None     Cardiac Monitoring: None  Code Status: No CPR- Do NOT Intubate     Clinically Significant Risk Factors Present on Admission                # Drug Induced Coagulation Defect: home medication list includes an anticoagulant medication    # Hypertension: Noted on problem list                      Disposition Plan      Expected Discharge Date: 07/24/2025                The patient's care was discussed with the Attending Physician, Dr. Aiken, Bedside Nurse, Patient, and GI Consultant(s).    Luis Slade PA-C  Hospitalist Service  [facility]  Securely message with [email or web address] (more info)  Text page via [employer] Paging/Directory     ______________________________________________________________________    Chief Complaint   Bright red blood per rectum    History is obtained from the patient, chart review    History of Present Illness   Yovani AN  Merna is a 74 year old male with PMHx significant for atrial fibrillation, prostate cancer, DM type II, hypertension, HLD, and anxiety.  He presented to the ER with multiple days of bright red blood per rectum.  Admitted for further care management    Patient seen alongside GI.  He reports about 2 to 3 days of bright red blood per rectum that has been present both with stooling and when not.  He reports he has been sleeping on Chux pads due to bleeding and this morning he woke up in a large pool of blood on the Chux pad so he decided to come to the ER.  He denies abdominal pain.  No nausea or vomiting.  Denies history of hemorrhoids.  He takes Tylenol but no NSAIDs.  He is on prednisone to prevent chemo side effects with his prostate medications.  He does report multiple falls over the last 6 months with increasing weakness.  He follows with PT and OT as an outpatient.  Started Mounjaro about 1 month ago and feels he has gotten weaker since then.  He has had 2 BMs since arrival to the ER without presence of blood.     Review of Systems    The 10 point Review of Systems is negative other than noted in the HPI or here.      Past Medical History    I have reviewed this patient's medical history and updated it with pertinent information if needed.   Past Medical History:   Diagnosis Date    ASCVD (arteriosclerotic cardiovascular disease)     Atrial flutter (H)     Depression     Edema     GERD (gastroesophageal reflux disease)     High triglycerides     HTN (hypertension)     Low vitamin B12 level     Metabolic syndrome     TRENTON on CPAP     CPAP    Prostate CA (H)        Past Surgical History   I have reviewed this patient's surgical history and updated it with pertinent information if needed.  Past Surgical History:   Procedure Laterality Date    CHOLECYSTECTOMY      CRYOABLATION  07/2019    HYDROCELECTOMY SCROTAL      KNEE SURGERY      CA SURG DIAGNOSTIC EXAM, ANORECTAL N/A 12/26/2019    Procedure: EXAM UNDER  ANESTHESIA WITH EXCISION OF ANAL SKIN TAG;  Surgeon: Lenny Ramsey MD;  Location: [facility] [place];  Service: General    PROSTATE SURGERY      VASECTOMY         Social History   I have reviewed this patient's social history and updated it with pertinent information if needed.  Social History     Tobacco Use    Smoking status: Never    Smokeless tobacco: Never   Substance Use Topics    Alcohol use: Yes     Comment: Alcoholic Drinks/day: occassional    Drug use: No       Family History   I have reviewed this patient's family history and updated it with pertinent information if needed.  Family History   Problem Relation Age of Onset    Coronary Artery Disease Mother     Coronary Artery Disease Father        Prior to Admission Medications   Prior to Admission Medications   Prescriptions Last Dose Informant Patient Reported? Taking?   MOUNJARO 5 MG/0.5ML SOAJ auto-injector pen   Yes Yes   Sig: Inject 5 mg subcutaneously once a week. Fridays   TIADYLT  MG 24 hr ER beaded capsule 7/21/2025 Evening  Yes Yes   Sig: Take 420 mg by mouth daily.   XARELTO ANTICOAGULANT 20 MG TABS tablet 7/21/2025 Evening  Yes Yes   Sig: Take 20 mg by mouth daily (with dinner).   abiraterone (ZYTIGA) 250 MG tablet 7/21/2025 Noon  Yes Yes   Sig: Take 500 mg by mouth daily.   atorvastatin (LIPITOR) 20 MG tablet 7/21/2025 Bedtime  Yes Yes   Sig: Take 20 mg by mouth At Bedtime    buPROPion (WELLBUTRIN XL) 150 MG 24 hr tablet 7/22/2025 Morning  Yes Yes   Sig: Take 150 mg by mouth every morning.   ferrous sulfate (FEROSUL) 325 (65 Fe) MG tablet 7/22/2025 Morning  Yes Yes   Sig: Take 325 mg by mouth daily with food.   hydrochlorothiazide (HYDRODIURIL) 25 MG tablet 7/22/2025 Morning  Yes Yes   Sig: Take 25 mg by mouth daily.   leuprolide, 3 Month, (ELIGARD) 22.5 MG   Yes Yes   Sig: Inject 22.5 mg Subcutaneous every 3 months   losartan (COZAAR) 100 MG tablet 7/22/2025 Morning  Yes Yes   Sig: Take 1 tablet by mouth daily.   metoprolol succinate  ER (TOPROL XL) 25 MG 24 hr tablet 7/22/2025 Morning  Yes Yes   Sig: Take 25 mg by mouth daily.   potassium chloride rhett ER (KLOR-CON M10) 10 MEQ CR tablet 7/22/2025 Morning  Yes Yes   Sig: Take 10 mEq by mouth daily.   predniSONE (DELTASONE) 10 MG tablet 7/22/2025 Morning  Yes Yes   Sig: Take 10 mg by mouth daily.   venlafaxine (EFFEXOR XR) 150 MG 24 hr capsule 7/22/2025 Morning  Yes Yes   Sig: Take 150 mg by mouth daily.      Facility-Administered Medications: None     Allergies   Allergies   Allergen Reactions    Other Environmental Allergy Unknown     Cockroaches, dust mites....the patient had allergy testing done and showed he might me allergic.  Never has had reaction    Shellfish Containing Products [Shellfish-Derived Products] Unknown     Pt did allergy testing and showed shellfish, pt has never had a reaction       Physical Exam   Vital Signs: Temp: 98.2  F (36.8  C) Temp src: Oral BP: 131/79 Pulse: 61   Resp: 18 SpO2: 98 % O2 Device: None (Room air)    Weight: 314 lbs 8 oz    Constitutional: awake, alert, cooperative, in no acute distress. A&Ox3    Eyes: EOM, PERRLA. Sclera clear, conjunctiva normal. Anicteric   HENT: Normocephalic, without obvious abnormality, atraumatic.   Respiratory: No increased work of breathing. CTAB  Cards: RRR, no murmurs appreciated. Does have 1+ LE edema bilaterally   GI: Soft, non-tender without rebound or guarding.   Musculoskeletal:  Full range of motion noted.    Neurologic: Cranial nerves II-XII are grossly intact.   Neuropsychiatric: General: normal, calm and normal eye contact. Normal affect        Data   Data reviewed today: I reviewed all medications, new labs and imaging results over the last 24 hours. See A/P for discussion on these results.     Recent Labs   Lab 07/22/25  1054   WBC 13.3*   HGB 12.2*   MCV 83      INR 3.27*      POTASSIUM 3.5   CHLORIDE 101   CO2 27   BUN 23.5*   CR 1.46*   ANIONGAP 11   LARRY 10.0   *   ALBUMIN 3.8   PROTTOTAL 6.7    BILITOTAL 0.8   ALKPHOS 176*   ALT 27   AST 24       Recent Results (from the past 24 hours)   CTA GI Bleed    Narrative    EXAM: CTA GI BLEED  LOCATION: [facility]  DATE: 7/22/2025    INDICATION: BRBPR  COMPARISON: CT abdomen and pelvis from 06/03/2024  TECHNIQUE: CT angiogram abdomen pelvis during arterial phase of injection of IV contrast. 2D and 3D MIP reconstructions were performed by the CT technologist. Dose reduction techniques were used.  CONTRAST: isovue 370 90ml    FINDINGS:  ANGIOGRAM ABDOMEN/PELVIS: There is no evidence of intraluminal contrast extravasation within the bowel to suggest active GI bleeding at this time. The aorta is widely patent and normal in caliber. The celiac, superior mesenteric, renal, and inferior   mesenteric arteries are patent without aneurysm or clinically significant stenosis. The bilateral iliac arteries are patent.    LOWER CHEST: There is mild atelectasis and/or scarring in the imaged lung bases.    HEPATOBILIARY: Hepatic morphology is normal. No hepatic surface nodularity. No significant hepatic steatosis. There are a few subcentimeter hypoattenuating foci that are too small to adequately characterize but are statistically favored to represent   small cysts. The gallbladder is absent.    PANCREAS: Normal.    SPLEEN: Unremarkable. Few small splenules.    ADRENAL GLANDS: Normal.    KIDNEYS/BLADDER: A fat-containing lesion in the right kidney measures approximately 3.7 cm (series 10 image 164) likely represents an angiomyolipoma, slightly increased in size compared to the prior study from 2024 measured 3.3 cm. Additionally, there   are multiple large right renal cysts measuring up to 12 cm, requiring no follow-up. An area of mixed fat attenuation and stranding along the posterior aspect of the left kidney measures approximately 4.9 cm (series 10 image 196), not significantly   changed in size compared to the prior exam, possibly representing an  additional angiomyolipoma, versus sequela of resolved prior left perinephric hematoma. A cyst along the upper pole left kidney measures 4.8 cm, requiring no follow-up. There is no   hydronephrosis. The urinary bladder is decompressed and not well evaluated.    BOWEL: No evidence of bowel obstruction. No significant focal bowel wall thickening.    LYMPH NODES: No lymphadenopathy.    PELVIC ORGANS: The prostate gland is absent, with multiple fiducial markers again seen in the prostatectomy bed.    MUSCULOSKELETAL: Multilevel degenerative changes throughout the imaged spine. Chronic appearing right-sided rib fractures.      Impression    IMPRESSION:  1.  No evidence of active GI bleeding at this time.  2.  Fat-containing lesion in the right kidney measuring up to 3.7 cm likely represents an angiomyolipoma, previously 3.3 cm on the prior study from 2024. An area of mixed fat attenuation and stranding along the posterior aspect of the left kidney   measures approximately 4.9 cm, not significantly changed in size compared to the prior exam, possibly representing an additional fat predominant angiomyolipoma, versus sequela of resolved prior left perinephric hematoma. Renal ultrasound could be   considered on a nonemergent basis for further characterization if clinically indicated.

## 2025-07-22 NOTE — CONSULTS
GI ATTENDING BRIEF ADDENDUM:  The patient was seen and examined.  Discussed with Emily Lorenzo.  Agree with findings and plan as above with the following addendum. 20 min were spent on providing patient care, including patient evaluation, reviewing documentation/test results, and , in addition to the time spent by the NP/PA.    4-year-old male with a past medical history of prostate cancer status post prostatectomy as well as radiation as well as A-fib on Xarelto as well as occasional prednisone, obesity, type 2 diabetes, hypertension, TRENTON who presents here for painless hematochezia.  He has had this for 2 to 3 days.  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  He has been up-to-date with colon cancer screening with Cologuard that is not come back positive supposedly -1-year ago.  He is at risk for peptic ulcer disease in the setting of Xarelto and prednisone use.  Will proceed with upper endoscopy and colonoscopy.  INR is supratherapeutic likely in the setting of vitamin K deficiency as well as Xarelto use.  Will give IV vitamin K while patient is here and hold Xarelto.    GoLytely split prep dosing has been prescribed.    Darien Melchor MD  [facility]            GI CONSULT NOTE      Name: Yovani Bauman  Medical Record #: [id number]  YOB: 1950  Date of Admission: 7/22/2025  Date/Time: 7/22/2025/3:31 PM     CHIEF COMPLAINT: rectal bleeding     HISTORY OF PRESENT ILLNESS: This is a 74 year old year old White patient seen at the request of the ER with a history of obesity, DMII, HTN, TRENTON, afib on Xarelto, and prostate cancer S/P prostatectomy and prior radiation who presents to the ED with about 3 days of bright red blood per rectum happening 3-4 times per day.  At baseline he has a BM once daily, and he has never had bleeding like this in the past.    He denies having abdominal pain or rectal pain.  His appetite has been normal without nausea/vomiting, no heartburn or  dysphagia.  He has lost about 35 pounds since starting Monjaro for obesity.  No use of tobacco or alcohol, and no use of NSAIDs or aspirin.    His last screening colonoscopy was in 2005 showing an external skin tag but otherwise normal.  He reports having negative cologuard tests, most recently about a year ago.  No personal history of colon polyps reported.    PAST MEDICAL HISTORY:  Past Medical History:   Diagnosis Date    ASCVD (arteriosclerotic cardiovascular disease)     Atrial flutter (H)     Depression     Edema     GERD (gastroesophageal reflux disease)     High triglycerides     HTN (hypertension)     Low vitamin B12 level     Metabolic syndrome     TRENTON on CPAP     CPAP    Prostate CA (H)        FAMILY HISTORY:  Family History   Problem Relation Age of Onset    Coronary Artery Disease Mother     Coronary Artery Disease Father        SOCIAL HISTORY:  Social History     Socioeconomic History    Marital status:      Spouse name: Not on file    Number of children: Not on file    Years of education: Not on file    Highest education level: Not on file   Occupational History    Not on file   Tobacco Use    Smoking status: Never    Smokeless tobacco: Never   Substance and Sexual Activity    Alcohol use: Yes     Comment: Alcoholic Drinks/day: occassional    Drug use: No    Sexual activity: Not on file   Other Topics Concern    Not on file   Social History Narrative    Not on file     Social Drivers of Health     Financial Resource Strain: Low Risk  (9/27/2024)    Received from [employer] [facility]    Financial Resource Strain     Difficulty of Paying Living Expenses: 3     Difficulty of Paying Living Expenses: Not on file   Food Insecurity: Patient Declined (2/1/2025)    [facility]    Hunger Vital Sign     Worried About Running Out of Food in the Last Year: Patient declined     Ran Out of Food in the Last Year: Patient declined   Transportation Needs: Patient Declined  (2/1/2025)    Received from [employer]    PRAPARE - Transportation     Lack of Transportation (Medical): Patient declined     Lack of Transportation (Non-Medical): Patient declined   Physical Activity: Not on file   Stress: Not on file   Social Connections: Socially Integrated (9/27/2024)    Received from [employer] [facility]    Social Connections     Do you often feel lonely or isolated from those around you?: 0   Interpersonal Safety: Patient Declined (2/1/2025)    Received from [employer]    Humiliation, Afraid, Rape, and Kick questionnaire     Fear of Current or Ex-Partner: Patient declined     Emotionally Abused: Patient declined     Physically Abused: Patient declined     Sexually Abused: Patient declined   Housing Stability: Patient Declined (2/1/2025)    Received from [employer]    Housing Stability Vital Sign     Unable to Pay for Housing in the Last Year: Patient declined     Number of Times Moved in the Last Year: Not on file     Homeless in the Last Year: Patient declined       MEDICATIONS PRIOR TO ADMISSION: (Not in a hospital admission)         ALLERGIES: Other environmental allergy and Shellfish containing products [shellfish-derived products]    REVIEW OF SYSTEMS (ROS): Complete review of systems negative other than listed in HPI.    PHYSICAL EXAM:    /79   Pulse 61   Temp 98.2  F (36.8  C) (Oral)   Resp 18   Wt (!) 142.7 kg (314 lb 8 oz)   SpO2 98%   BMI 45.13 kg/m      GENERAL: Pleasant, no obvious distress    SKIN: No jaundice    NECK: Supple without adenopathy    EYES: No scleral icterus    LUNGS: Clear to auscultation bilaterally    HEART: Regular rate and rhythm, S1 and S2 present, no lower extremity edema    ABDOMEN: Soft, obese, non-distended, non-tender, no guarding/rebound/mass, bowel sounds normal, no obvious organomegaly    MUSKULOSKELETAL:  Warm and well perfused, moves all extremities well    NEUROLOGIC: Alert and oriented    PSYCHIATRIC:  Normal affect    LAB DATA:  Recent Labs   Lab Test 07/22/25  1054 12/18/21  1022 12/17/21  1559 12/17/21  0941 12/16/21  1149 12/16/21  0742   WBC 13.3* 8.6  --  10.1  --  13.2*   HGB 12.2* 8.4* 8.7* 8.8*   < > 9.1*   MCV 83 89  --  88  --  88    155  --  149*  --  167   INR 3.27*  --   --  1.17*  --  1.30*    < > = values in this interval not displayed.     Recent Labs   Lab Test 07/22/25  1054 12/18/21  1022 12/17/21  0941    140 139   POTASSIUM 3.5 3.7 3.6   CHLORIDE 101 106 103   CO2 27 26 27   BUN 23.5* 25* 28*   CR 1.46* 1.33* 1.58*   ANIONGAP 11 8 9   LARRY 10.0 8.6 9.2   * 135* 153*     Recent Labs   Lab Test 07/22/25  1054 12/17/21  0941 12/16/21  0742 12/15/21  0911 12/15/21  0724   ALBUMIN 3.8 2.9* 3.0*   < >  --    BILITOTAL 0.8 0.4 0.3   < >  --    ALT 27 19 14   < >  --    AST 24 16 11   < >  --    ALKPHOS 176* 66 61   < >  --    PROTEIN  --   --   --   --  20*    < > = values in this interval not displayed.       IMAGING:  CTA GI Bleed  Result Date: 7/22/2025  EXAM: CTA GI BLEED LOCATION: [facility] DATE: 7/22/2025 INDICATION: BRBPR COMPARISON: CT abdomen and pelvis from 06/03/2024 TECHNIQUE: CT angiogram abdomen pelvis during arterial phase of injection of IV contrast. 2D and 3D MIP reconstructions were performed by the CT technologist. Dose reduction techniques were used. CONTRAST: isovue 370 90ml FINDINGS: ANGIOGRAM ABDOMEN/PELVIS: There is no evidence of intraluminal contrast extravasation within the bowel to suggest active GI bleeding at this time. The aorta is widely patent and normal in caliber. The celiac, superior mesenteric, renal, and inferior mesenteric arteries are patent without aneurysm or clinically significant stenosis. The bilateral iliac arteries are patent. LOWER CHEST: There is mild atelectasis and/or scarring in the imaged lung bases. HEPATOBILIARY: Hepatic morphology is normal. No hepatic surface nodularity. No significant hepatic  steatosis. There are a few subcentimeter hypoattenuating foci that are too small to adequately characterize but are statistically favored to represent small cysts. The gallbladder is absent. PANCREAS: Normal. SPLEEN: Unremarkable. Few small splenules. ADRENAL GLANDS: Normal. KIDNEYS/BLADDER: A fat-containing lesion in the right kidney measures approximately 3.7 cm (series 10 image 164) likely represents an angiomyolipoma, slightly increased in size compared to the prior study from 2024 measured 3.3 cm. Additionally, there are multiple large right renal cysts measuring up to 12 cm, requiring no follow-up. An area of mixed fat attenuation and stranding along the posterior aspect of the left kidney measures approximately 4.9 cm (series 10 image 196), not significantly changed in size compared to the prior exam, possibly representing an additional angiomyolipoma, versus sequela of resolved prior left perinephric hematoma. A cyst along the upper pole left kidney measures 4.8 cm, requiring no follow-up. There is no hydronephrosis. The urinary bladder is decompressed and not well evaluated. BOWEL: No evidence of bowel obstruction. No significant focal bowel wall thickening. LYMPH NODES: No lymphadenopathy. PELVIC ORGANS: The prostate gland is absent, with multiple fiducial markers again seen in the prostatectomy bed. MUSCULOSKELETAL: Multilevel degenerative changes throughout the imaged spine. Chronic appearing right-sided rib fractures.     IMPRESSION: 1.  No evidence of active GI bleeding at this time. 2.  Fat-containing lesion in the right kidney measuring up to 3.7 cm likely represents an angiomyolipoma, previously 3.3 cm on the prior study from 2024. An area of mixed fat attenuation and stranding along the posterior aspect of the left kidney measures approximately 4.9 cm, not significantly changed in size compared to the prior exam, possibly representing an additional fat predominant angiomyolipoma, versus sequela of  resolved prior left perinephric hematoma. Renal ultrasound could be considered on a nonemergent basis for further characterization if clinically indicated.     ASSESSMENT:  He is a pleasant 74 year old male with a history of obesity, DMII, HTN, TRENTON, afib on Xarelto, and prostate cancer S/P prostatectomy and prior radiation who presents to the ED with about 3 days of bright red blood per rectum, likely due to a lower GI bleed such as radiation proctitis, AVM, diverticular bleed, or other.  CTA was negative for active bleeding.  Upper GI bleeding source is less likely but should be considered given his use of Xarelto and prednisone.  He has mild anemia but stable vital signs.  His last colonoscopy was 20 years ago though he reports having a negative cologuard stool test last year.  Apparently the bleeding may be slowing or stopping, however we still recommend evaluation to further define the source of bleeding.  He is agreeable, and we'll plan EGD and colonoscopy tomorrow.    Active Problems:    BRBPR (bright red blood per rectum)    PLAN:  Discussed with Dr.de Medina.  37 minutes of total time was spent providing patient care, including patient evaluation, reviewing documentation/test results, and .     Hold Xarelto.  Clear liquids today then NPO at midnight.  Golytely prep to start this afternoon.  Two liters today, and two liters early tomorrow morning.  Agree with empiric PPI.  Give IV vitamin K x 1 today to improve INR before procedures.  Check labs tomorrow morning.  Further recommendations pending findings.                                                 Emily Lorenzo PA-C  Thank you for the opportunity to participate in the care of this patient.   Please feel free to call me with any questions or concerns.  Phone number [phone].                    CC: [facility]

## 2025-07-23 ENCOUNTER — APPOINTMENT (OUTPATIENT)
Dept: PHYSICAL THERAPY | Facility: HOSPITAL | Age: 75
DRG: 394 | End: 2025-07-23
Attending: STUDENT IN AN ORGANIZED HEALTH CARE EDUCATION/TRAINING PROGRAM
Payer: MEDICARE

## 2025-07-23 ENCOUNTER — ANESTHESIA (OUTPATIENT)
Dept: SURGERY | Facility: HOSPITAL | Age: 75
End: 2025-07-23
Payer: MEDICARE

## 2025-07-23 LAB
ANION GAP SERPL CALCULATED.3IONS-SCNC: 15 MMOL/L (ref 7–15)
BUN SERPL-MCNC: 20.4 MG/DL (ref 8–23)
CALCIUM SERPL-MCNC: 9.9 MG/DL (ref 8.8–10.4)
CHLORIDE SERPL-SCNC: 100 MMOL/L (ref 98–107)
COLONOSCOPY: NORMAL
CREAT SERPL-MCNC: 1.19 MG/DL (ref 0.67–1.17)
EGFRCR SERPLBLD CKD-EPI 2021: 65 ML/MIN/1.73M2
ERYTHROCYTE [DISTWIDTH] IN BLOOD BY AUTOMATED COUNT: 15.9 % (ref 10–15)
GLUCOSE BLDC GLUCOMTR-MCNC: 105 MG/DL (ref 70–99)
GLUCOSE BLDC GLUCOMTR-MCNC: 106 MG/DL (ref 70–99)
GLUCOSE BLDC GLUCOMTR-MCNC: 151 MG/DL (ref 70–99)
GLUCOSE BLDC GLUCOMTR-MCNC: 169 MG/DL (ref 70–99)
GLUCOSE BLDC GLUCOMTR-MCNC: 96 MG/DL (ref 70–99)
GLUCOSE SERPL-MCNC: 91 MG/DL (ref 70–99)
HCO3 SERPL-SCNC: 25 MMOL/L (ref 22–29)
HCT VFR BLD AUTO: 39.1 % (ref 40–53)
HGB BLD-MCNC: 12.8 G/DL (ref 13.3–17.7)
INR PPP: 1.55 (ref 0.85–1.15)
MAGNESIUM SERPL-MCNC: 1.9 MG/DL (ref 1.7–2.3)
MCH RBC QN AUTO: 26.8 PG (ref 26.5–33)
MCHC RBC AUTO-ENTMCNC: 32.7 G/DL (ref 31.5–36.5)
MCV RBC AUTO: 82 FL (ref 78–100)
PLATELET # BLD AUTO: 222 10E3/UL (ref 150–450)
POTASSIUM SERPL-SCNC: 2.9 MMOL/L (ref 3.4–5.3)
POTASSIUM SERPL-SCNC: 3.3 MMOL/L (ref 3.4–5.3)
PROTHROMBIN TIME: 18.8 SECONDS (ref 11.8–14.8)
RBC # BLD AUTO: 4.78 10E6/UL (ref 4.4–5.9)
SODIUM SERPL-SCNC: 140 MMOL/L (ref 135–145)
UPPER GI ENDOSCOPY: NORMAL
WBC # BLD AUTO: 12 10E3/UL (ref 4–11)

## 2025-07-23 PROCEDURE — 36415 COLL VENOUS BLD VENIPUNCTURE: CPT | Performed by: INTERNAL MEDICINE

## 2025-07-23 PROCEDURE — 5A09357 ASSISTANCE WITH RESPIRATORY VENTILATION, LESS THAN 24 CONSECUTIVE HOURS, CONTINUOUS POSITIVE AIRWAY PRESSURE: ICD-10-PCS | Performed by: INTERNAL MEDICINE

## 2025-07-23 PROCEDURE — 97161 PT EVAL LOW COMPLEX 20 MIN: CPT | Mod: GP

## 2025-07-23 PROCEDURE — 83735 ASSAY OF MAGNESIUM: CPT | Performed by: INTERNAL MEDICINE

## 2025-07-23 PROCEDURE — 84132 ASSAY OF SERUM POTASSIUM: CPT | Performed by: INTERNAL MEDICINE

## 2025-07-23 PROCEDURE — 370N000017 HC ANESTHESIA TECHNICAL FEE, PER MIN: Performed by: INTERNAL MEDICINE

## 2025-07-23 PROCEDURE — 99233 SBSQ HOSP IP/OBS HIGH 50: CPT | Performed by: INTERNAL MEDICINE

## 2025-07-23 PROCEDURE — 360N000075 HC SURGERY LEVEL 2, PER MIN: Performed by: INTERNAL MEDICINE

## 2025-07-23 PROCEDURE — 97530 THERAPEUTIC ACTIVITIES: CPT | Mod: GP

## 2025-07-23 PROCEDURE — 250N000012 HC RX MED GY IP 250 OP 636 PS 637: Performed by: STUDENT IN AN ORGANIZED HEALTH CARE EDUCATION/TRAINING PROGRAM

## 2025-07-23 PROCEDURE — 272N000001 HC OR GENERAL SUPPLY STERILE: Performed by: INTERNAL MEDICINE

## 2025-07-23 PROCEDURE — 999N000141 HC STATISTIC PRE-PROCEDURE NURSING ASSESSMENT: Performed by: INTERNAL MEDICINE

## 2025-07-23 PROCEDURE — 85027 COMPLETE CBC AUTOMATED: CPT | Performed by: STUDENT IN AN ORGANIZED HEALTH CARE EDUCATION/TRAINING PROGRAM

## 2025-07-23 PROCEDURE — 250N000009 HC RX 250: Performed by: NURSE ANESTHETIST, CERTIFIED REGISTERED

## 2025-07-23 PROCEDURE — 120N000001 HC R&B MED SURG/OB

## 2025-07-23 PROCEDURE — 999N000157 HC STATISTIC RCP TIME EA 10 MIN

## 2025-07-23 PROCEDURE — 99207 PR NO BILLABLE SERVICE THIS VISIT: CPT | Mod: GC | Performed by: INTERNAL MEDICINE

## 2025-07-23 PROCEDURE — 250N000013 HC RX MED GY IP 250 OP 250 PS 637: Performed by: INTERNAL MEDICINE

## 2025-07-23 PROCEDURE — 36415 COLL VENOUS BLD VENIPUNCTURE: CPT | Performed by: STUDENT IN AN ORGANIZED HEALTH CARE EDUCATION/TRAINING PROGRAM

## 2025-07-23 PROCEDURE — 0DB68ZX EXCISION OF STOMACH, VIA NATURAL OR ARTIFICIAL OPENING ENDOSCOPIC, DIAGNOSTIC: ICD-10-PCS | Performed by: INTERNAL MEDICINE

## 2025-07-23 PROCEDURE — 258N000003 HC RX IP 258 OP 636: Performed by: ANESTHESIOLOGY

## 2025-07-23 PROCEDURE — 250N000011 HC RX IP 250 OP 636: Performed by: NURSE ANESTHETIST, CERTIFIED REGISTERED

## 2025-07-23 PROCEDURE — 97116 GAIT TRAINING THERAPY: CPT | Mod: GP

## 2025-07-23 PROCEDURE — 0DB48ZX EXCISION OF ESOPHAGOGASTRIC JUNCTION, VIA NATURAL OR ARTIFICIAL OPENING ENDOSCOPIC, DIAGNOSTIC: ICD-10-PCS | Performed by: INTERNAL MEDICINE

## 2025-07-23 PROCEDURE — 85610 PROTHROMBIN TIME: CPT | Performed by: PHYSICIAN ASSISTANT

## 2025-07-23 PROCEDURE — 88305 TISSUE EXAM BY PATHOLOGIST: CPT | Mod: TC | Performed by: INTERNAL MEDICINE

## 2025-07-23 PROCEDURE — 82374 ASSAY BLOOD CARBON DIOXIDE: CPT | Performed by: STUDENT IN AN ORGANIZED HEALTH CARE EDUCATION/TRAINING PROGRAM

## 2025-07-23 PROCEDURE — 258N000003 HC RX IP 258 OP 636: Performed by: NURSE ANESTHETIST, CERTIFIED REGISTERED

## 2025-07-23 PROCEDURE — 250N000013 HC RX MED GY IP 250 OP 250 PS 637: Performed by: STUDENT IN AN ORGANIZED HEALTH CARE EDUCATION/TRAINING PROGRAM

## 2025-07-23 PROCEDURE — 0DJD8ZZ INSPECTION OF LOWER INTESTINAL TRACT, VIA NATURAL OR ARTIFICIAL OPENING ENDOSCOPIC: ICD-10-PCS | Performed by: INTERNAL MEDICINE

## 2025-07-23 RX ORDER — PROPOFOL 10 MG/ML
INJECTION, EMULSION INTRAVENOUS CONTINUOUS PRN
Status: DISCONTINUED | OUTPATIENT
Start: 2025-07-23 | End: 2025-07-23

## 2025-07-23 RX ORDER — ONDANSETRON 2 MG/ML
4 INJECTION INTRAMUSCULAR; INTRAVENOUS
Status: DISCONTINUED | OUTPATIENT
Start: 2025-07-23 | End: 2025-07-23 | Stop reason: HOSPADM

## 2025-07-23 RX ORDER — LIDOCAINE 40 MG/G
CREAM TOPICAL
Status: DISCONTINUED | OUTPATIENT
Start: 2025-07-23 | End: 2025-07-23 | Stop reason: HOSPADM

## 2025-07-23 RX ORDER — SODIUM CHLORIDE, SODIUM LACTATE, POTASSIUM CHLORIDE, CALCIUM CHLORIDE 600; 310; 30; 20 MG/100ML; MG/100ML; MG/100ML; MG/100ML
INJECTION, SOLUTION INTRAVENOUS CONTINUOUS PRN
Status: DISCONTINUED | OUTPATIENT
Start: 2025-07-23 | End: 2025-07-23

## 2025-07-23 RX ORDER — POTASSIUM CHLORIDE 1.5 G/1.58G
20 POWDER, FOR SOLUTION ORAL ONCE
Status: COMPLETED | OUTPATIENT
Start: 2025-07-23 | End: 2025-07-23

## 2025-07-23 RX ORDER — PANTOPRAZOLE SODIUM 40 MG/1
40 TABLET, DELAYED RELEASE ORAL
Status: DISCONTINUED | OUTPATIENT
Start: 2025-07-23 | End: 2025-07-24 | Stop reason: HOSPADM

## 2025-07-23 RX ORDER — POTASSIUM CHLORIDE 1.5 G/1.58G
40 POWDER, FOR SOLUTION ORAL ONCE
Status: COMPLETED | OUTPATIENT
Start: 2025-07-23 | End: 2025-07-23

## 2025-07-23 RX ORDER — POTASSIUM CHLORIDE 1500 MG/1
40 TABLET, EXTENDED RELEASE ORAL ONCE
Status: COMPLETED | OUTPATIENT
Start: 2025-07-23 | End: 2025-07-23

## 2025-07-23 RX ORDER — SODIUM CHLORIDE, SODIUM LACTATE, POTASSIUM CHLORIDE, CALCIUM CHLORIDE 600; 310; 30; 20 MG/100ML; MG/100ML; MG/100ML; MG/100ML
INJECTION, SOLUTION INTRAVENOUS CONTINUOUS
Status: DISCONTINUED | OUTPATIENT
Start: 2025-07-23 | End: 2025-07-23 | Stop reason: HOSPADM

## 2025-07-23 RX ADMIN — BUPROPION HYDROCHLORIDE 150 MG: 150 TABLET, FILM COATED, EXTENDED RELEASE ORAL at 12:36

## 2025-07-23 RX ADMIN — POTASSIUM CHLORIDE 40 MEQ: 1.5 POWDER, FOR SOLUTION ORAL at 12:05

## 2025-07-23 RX ADMIN — INSULIN ASPART 1 UNITS: 100 INJECTION, SOLUTION INTRAVENOUS; SUBCUTANEOUS at 16:41

## 2025-07-23 RX ADMIN — ABIRATERONE ACETATE 500 MG: 250 TABLET ORAL at 12:52

## 2025-07-23 RX ADMIN — POTASSIUM CHLORIDE 20 MEQ: 1.5 POWDER, FOR SOLUTION ORAL at 14:00

## 2025-07-23 RX ADMIN — HYDROCHLOROTHIAZIDE 25 MG: 25 TABLET ORAL at 12:55

## 2025-07-23 RX ADMIN — ATORVASTATIN CALCIUM 20 MG: 10 TABLET, FILM COATED ORAL at 21:00

## 2025-07-23 RX ADMIN — DILTIAZEM HYDROCHLORIDE 420 MG: 180 CAPSULE, EXTENDED RELEASE ORAL at 21:10

## 2025-07-23 RX ADMIN — POTASSIUM CHLORIDE 10 MEQ: 750 TABLET, EXTENDED RELEASE ORAL at 12:40

## 2025-07-23 RX ADMIN — PREDNISONE 5 MG: 5 TABLET ORAL at 12:39

## 2025-07-23 RX ADMIN — METOPROLOL SUCCINATE 25 MG: 25 TABLET, FILM COATED, EXTENDED RELEASE ORAL at 06:29

## 2025-07-23 RX ADMIN — PANTOPRAZOLE SODIUM 40 MG: 40 TABLET, DELAYED RELEASE ORAL at 12:38

## 2025-07-23 RX ADMIN — TOPICAL ANESTHETIC 1 SPRAY: 200 SPRAY DENTAL; PERIODONTAL at 07:38

## 2025-07-23 RX ADMIN — FERROUS SULFATE TAB 325 MG (65 MG ELEMENTAL FE) 325 MG: 325 (65 FE) TAB at 12:37

## 2025-07-23 RX ADMIN — POTASSIUM CHLORIDE 40 MEQ: 1500 TABLET, EXTENDED RELEASE ORAL at 20:58

## 2025-07-23 RX ADMIN — METOPROLOL SUCCINATE 25 MG: 25 TABLET, FILM COATED, EXTENDED RELEASE ORAL at 12:38

## 2025-07-23 RX ADMIN — SODIUM CHLORIDE, POTASSIUM CHLORIDE, SODIUM LACTATE AND CALCIUM CHLORIDE: 600; 310; 30; 20 INJECTION, SOLUTION INTRAVENOUS at 07:36

## 2025-07-23 RX ADMIN — VENLAFAXINE HYDROCHLORIDE 150 MG: 75 CAPSULE, EXTENDED RELEASE ORAL at 12:40

## 2025-07-23 RX ADMIN — PROPOFOL 100 MCG/KG/MIN: 10 INJECTION, EMULSION INTRAVENOUS at 07:39

## 2025-07-23 RX ADMIN — SODIUM CHLORIDE, SODIUM LACTATE, POTASSIUM CHLORIDE, AND CALCIUM CHLORIDE: .6; .31; .03; .02 INJECTION, SOLUTION INTRAVENOUS at 07:06

## 2025-07-23 RX ADMIN — MIDAZOLAM HYDROCHLORIDE 2 MG: 1 INJECTION, SOLUTION INTRAMUSCULAR; INTRAVENOUS at 07:36

## 2025-07-23 RX ADMIN — LOSARTAN POTASSIUM 100 MG: 50 TABLET, FILM COATED ORAL at 12:53

## 2025-07-23 ASSESSMENT — ACTIVITIES OF DAILY LIVING (ADL)
ADLS_ACUITY_SCORE: 52
ADLS_ACUITY_SCORE: 51
ADLS_ACUITY_SCORE: 43
ADLS_ACUITY_SCORE: 51
ADLS_ACUITY_SCORE: 52
ADLS_ACUITY_SCORE: 61
ADLS_ACUITY_SCORE: 43
ADLS_ACUITY_SCORE: 43
ADLS_ACUITY_SCORE: 51
ADLS_ACUITY_SCORE: 51
ADLS_ACUITY_SCORE: 61
ADLS_ACUITY_SCORE: 44
ADLS_ACUITY_SCORE: 61
ADLS_ACUITY_SCORE: 43
ADLS_ACUITY_SCORE: 51
ADLS_ACUITY_SCORE: 43
ADLS_ACUITY_SCORE: 44
ADLS_ACUITY_SCORE: 52
ADLS_ACUITY_SCORE: 61

## 2025-07-23 ASSESSMENT — ENCOUNTER SYMPTOMS: DYSRHYTHMIAS: 1

## 2025-07-23 NOTE — PLAN OF CARE
"Goal Outcome Evaluation: Patient A/Ox4, up with x1 assist and walker up to BR, voiding freely w/o difficulty, watery stools, some incontinence with prep. completed bowel prep went to his procedure. VSS on RA BG stable 101, 97, 96 no coverage needed. Hgb 12.0. Home CPAP was worn overnight. Home meds in bag in room patient refused to sent to pharmacy. Continue to monitor.        Problem: Adult Inpatient Plan of Care  Goal: Plan of Care Review  Description: The Plan of Care Review/Shift note should be completed every shift.  The Outcome Evaluation is a brief statement about your assessment that the patient is improving, declining, or no change.  This information will be displayed automatically on your shift  note.  7/23/2025 0643 by Serene Leggett RN  Outcome: Progressing  7/23/2025 0414 by Serene Leggett RN  Outcome: Progressing  7/22/2025 2248 by Serene Leggett RN  Outcome: Progressing  Goal: Patient-Specific Goal (Individualized)  Description: You can add care plan individualizations to a care plan. Examples of Individualization might be:  \"Parent requests to be called daily at 9am for status\", \"I have a hard time hearing out of my right ear\", or \"Do not touch me to wake me up as it startles  me\".  7/23/2025 0643 by Serene Leggett RN  Outcome: Progressing  7/23/2025 0414 by Serene Leggett RN  Outcome: Progressing  7/22/2025 2248 by Serene Leggett RN  Outcome: Progressing  Goal: Absence of Hospital-Acquired Illness or Injury  7/23/2025 0643 by Serene Leggett RN  Outcome: Progressing  7/23/2025 0414 by Serene Leggett RN  Outcome: Progressing  7/22/2025 2248 by Serene Leggett RN  Outcome: Progressing  Intervention: Identify and Manage Fall Risk  Recent Flowsheet Documentation  Taken 7/23/2025 0600 by Serene Leggett RN  Safety Promotion/Fall Prevention:   activity supervised   assistive device/personal items within reach   nonskid shoes/slippers when out of bed   supervised activity   " patient and family education   safety round/check completed  Taken 7/23/2025 0227 by Serene Leggett RN  Safety Promotion/Fall Prevention:   activity supervised   assistive device/personal items within reach   nonskid shoes/slippers when out of bed   supervised activity   patient and family education   safety round/check completed  Taken 7/22/2025 2030 by Serene Leggett RN  Safety Promotion/Fall Prevention:   activity supervised   assistive device/personal items within reach   nonskid shoes/slippers when out of bed   supervised activity   patient and family education   safety round/check completed  Intervention: Prevent Skin Injury  Recent Flowsheet Documentation  Taken 7/23/2025 0600 by Serene Leggett RN  Body Position: position changed independently  Skin Protection: adhesive use limited  Taken 7/23/2025 0227 by Serene Leggett RN  Body Position: position changed independently  Skin Protection: adhesive use limited  Taken 7/22/2025 2030 by Serene Leggett RN  Body Position: position changed independently  Skin Protection: adhesive use limited  Intervention: Prevent and Manage VTE (Venous Thromboembolism) Risk  Recent Flowsheet Documentation  Taken 7/23/2025 0600 by Serene Leggett RN  VTE Prevention/Management: SCDs off (sequential compression devices)  Taken 7/23/2025 0227 by Serene Leggett RN  VTE Prevention/Management: SCDs off (sequential compression devices)  Taken 7/22/2025 2030 by Serene Leggett RN  VTE Prevention/Management: SCDs off (sequential compression devices)  Intervention: Prevent Infection  Recent Flowsheet Documentation  Taken 7/23/2025 0600 by Serene Leggett RN  Infection Prevention:   rest/sleep promoted   hand hygiene promoted  Taken 7/23/2025 0227 by Serene Leggett RN  Infection Prevention:   rest/sleep promoted   hand hygiene promoted  Taken 7/22/2025 2030 by Serene Leggett RN  Infection Prevention:   rest/sleep promoted   hand hygiene promoted  Goal: Optimal  Comfort and Wellbeing  7/23/2025 0643 by Serene Leggett RN  Outcome: Progressing  7/23/2025 0414 by Serene Leggett RN  Outcome: Progressing  7/22/2025 2248 by Serene Leggett RN  Outcome: Progressing  Intervention: Provide Person-Centered Care  Recent Flowsheet Documentation  Taken 7/23/2025 0600 by Serene Leggett RN  Trust Relationship/Rapport:   care explained   questions answered   thoughts/feelings acknowledged  Taken 7/23/2025 0227 by Serene Leggett RN  Trust Relationship/Rapport:   care explained   questions answered   thoughts/feelings acknowledged  Taken 7/22/2025 2030 by Serene Leggett RN  Trust Relationship/Rapport:   care explained   questions answered   thoughts/feelings acknowledged  Goal: Readiness for Transition of Care  7/23/2025 0643 by Serene Leggett RN  Outcome: Progressing  7/23/2025 0414 by Serene Leggett RN  Outcome: Progressing  Flowsheets (Taken 7/23/2025 0400)  Transportation Anticipated: family or friend will provide  7/22/2025 2248 by Serene Leggett RN  Outcome: Progressing  Intervention: Mutually Develop Transition Plan  Recent Flowsheet Documentation  Taken 7/23/2025 0400 by Serene Leggett RN  Transportation Anticipated: family or friend will provide  Patient/Family Anticipated Services at Transition: none  Patient/Family Anticipates Transition to: home with family  Equipment Currently Used at Home: walker, rolling     Problem: Delirium  Goal: Optimal Coping  7/23/2025 0643 by Serene Leggett RN  Outcome: Progressing  7/23/2025 0414 by Serene Leggett RN  Outcome: Progressing  7/22/2025 2248 by Serene Leggett RN  Outcome: Progressing  Intervention: Optimize Psychosocial Adjustment to Delirium  Recent Flowsheet Documentation  Taken 7/23/2025 0600 by Serene Leggett RN  Family/Support System Care: self-care encouraged  Taken 7/23/2025 0227 by Serene Leggett RN  Family/Support System Care: self-care encouraged  Taken 7/22/2025 2030 by Serene Leggett  SISSY AN  Family/Support System Care: self-care encouraged  Goal: Improved Behavioral Control  7/23/2025 0643 by Serene Leggett RN  Outcome: Progressing  7/23/2025 0414 by Serene Leggett RN  Outcome: Progressing  7/22/2025 2248 by Serene Leggett RN  Outcome: Progressing  Intervention: Prevent and Manage Agitation  Recent Flowsheet Documentation  Taken 7/23/2025 0600 by Serene Leggett RN  Environment Familiarity/Consistency: daily routine followed  Taken 7/23/2025 0227 by Serene Leggett RN  Environment Familiarity/Consistency: daily routine followed  Taken 7/22/2025 2030 by Serene Leggett RN  Environment Familiarity/Consistency: daily routine followed  Intervention: Minimize Safety Risk  Recent Flowsheet Documentation  Taken 7/23/2025 0600 by Serene Leggett RN  Enhanced Safety Measures:   review medications for side effects with activity   pain management  Trust Relationship/Rapport:   care explained   questions answered   thoughts/feelings acknowledged  Taken 7/23/2025 0227 by Serene Leggett RN  Enhanced Safety Measures:   review medications for side effects with activity   pain management  Trust Relationship/Rapport:   care explained   questions answered   thoughts/feelings acknowledged  Taken 7/22/2025 2030 by Serene Leggett RN  Enhanced Safety Measures:   review medications for side effects with activity   pain management  Trust Relationship/Rapport:   care explained   questions answered   thoughts/feelings acknowledged  Goal: Improved Attention and Thought Clarity  7/23/2025 0643 by Serene Leggett RN  Outcome: Progressing  7/23/2025 0414 by Serene Leggett RN  Outcome: Progressing  7/22/2025 2248 by Serene Leggett RN  Outcome: Progressing  Intervention: Maximize Cognitive Function  Recent Flowsheet Documentation  Taken 7/23/2025 0600 by Serene Leggett RN  Sensory Stimulation Regulation: care clustered  Reorientation Measures: clock in view  Taken 7/23/2025 0227 by Betsey  Serene AN RN  Sensory Stimulation Regulation: care clustered  Reorientation Measures: clock in view  Taken 7/22/2025 2030 by Serene Leggett RN  Sensory Stimulation Regulation: care clustered  Reorientation Measures: clock in view  Goal: Improved Sleep  7/23/2025 0643 by Serene Leggett RN  Outcome: Progressing  7/23/2025 0414 by Serene Leggett RN  Outcome: Progressing  7/22/2025 2248 by Serene Leggett RN  Outcome: Progressing  Intervention: Promote Sleep  Recent Flowsheet Documentation  Taken 7/23/2025 0227 by Serene Leggett RN  Sleep/Rest Enhancement: awakenings minimized  Taken 7/22/2025 2030 by Serene Leggett RN  Sleep/Rest Enhancement: awakenings minimized     Problem: Fall Injury Risk  Goal: Absence of Fall and Fall-Related Injury  Outcome: Progressing  Intervention: Identify and Manage Contributors  Recent Flowsheet Documentation  Taken 7/23/2025 0600 by Serene Leggett RN  Medication Review/Management: medications reviewed  Taken 7/23/2025 0227 by Serene Leggett RN  Medication Review/Management: medications reviewed  Taken 7/22/2025 2030 by Serene Leggett RN  Medication Review/Management: medications reviewed  Intervention: Promote Injury-Free Environment  Recent Flowsheet Documentation  Taken 7/23/2025 0600 by Serene Leggett RN  Safety Promotion/Fall Prevention:   activity supervised   assistive device/personal items within reach   nonskid shoes/slippers when out of bed   supervised activity   patient and family education   safety round/check completed  Taken 7/23/2025 0227 by Serene Leggett RN  Safety Promotion/Fall Prevention:   activity supervised   assistive device/personal items within reach   nonskid shoes/slippers when out of bed   supervised activity   patient and family education   safety round/check completed  Taken 7/22/2025 2030 by Serene Leggett RN  Safety Promotion/Fall Prevention:   activity supervised   assistive device/personal items within reach   nonskid  shoes/slippers when out of bed   supervised activity   patient and family education   safety round/check completed     Problem: Pain Acute  Goal: Optimal Pain Control and Function  Outcome: Progressing  Intervention: Prevent or Manage Pain  Recent Flowsheet Documentation  Taken 7/23/2025 0600 by Serene Leggett RN  Sensory Stimulation Regulation: care clustered  Medication Review/Management: medications reviewed  Taken 7/23/2025 0227 by Serene Leggett RN  Sensory Stimulation Regulation: care clustered  Sleep/Rest Enhancement: awakenings minimized  Medication Review/Management: medications reviewed  Taken 7/22/2025 2030 by Serene Leggett RN  Sensory Stimulation Regulation: care clustered  Sleep/Rest Enhancement: awakenings minimized  Medication Review/Management: medications reviewed            Serene Leggett RN

## 2025-07-23 NOTE — H&P
Pre-procedure Note    Reason for procedure: hematochezia    History and Physical Reviewed: Reviewed, no changes.    Pre-sedation assessment:    General: alert and cooperative  Airway: normal  Heart: regular rate  Lungs: normal respiratory effort     Sedation Plan based on assessment: MAC    Mallampati score: Class III (visualization of the soft palate and base of uvula)          ASA Classification: ASA 3 - Patient with moderate systemic disease with functional limitations    Impression: Patient deemed adequate candidate for MAC sedation    Risks, benefits and alternatives were discussed with the patient and informed consent was obtained.    Plan: EGD and colonoscopy    Thank you for the opportunity to participate in the care of this patient. Please feel free to call with any questions or concerns.     Phone number [phone].     Darien Melchor MD 7/23/2025 8:43 AM

## 2025-07-23 NOTE — PLAN OF CARE
Problem: Adult Inpatient Plan of Care  Goal: Plan of Care Review  Description: The Plan of Care Review/Shift note should be completed every shift.  The Outcome Evaluation is a brief statement about your assessment that the patient is improving, declining, or no change.  This information will be displayed automatically on your shift  note.  Outcome: Progressing     Problem: Adult Inpatient Plan of Care  Goal: Optimal Comfort and Wellbeing  Outcome: Progressing  Intervention: Provide Person-Centered Care  Recent Flowsheet Documentation  Taken 7/23/2025 0930 by Brandy Estrada RN  Trust Relationship/Rapport: care explained     Problem: Adult Inpatient Plan of Care  Goal: Optimal Comfort and Wellbeing  Intervention: Provide Person-Centered Care  Recent Flowsheet Documentation  Taken 7/23/2025 0930 by Brandy Estrada RN  Trust Relationship/Rapport: care explained   Goal Outcome Evaluation:       Pt had colonoscopy this AM   Has been tolerating clear liquid, testing out fulls  No pain has been reported by pt this shift  VSS  A/Ox4  No signs of bleeding  Potassium 2.9 this AM protocol ran, replacements given

## 2025-07-23 NOTE — ANESTHESIA CARE TRANSFER NOTE
Patient: Yovani Bauman    Procedure: Procedure(s):  COLONOSCOPY  ESOPHAGOGASTRODUODENOSCOPY WITH GASTRIC BIOPSIES       Diagnosis: BRBPR (bright red blood per rectum) [K62.5]  Diagnosis Additional Information: No value filed.    Anesthesia Type:   MAC     Note:    Oropharynx: oropharynx clear of all foreign objects  Level of Consciousness: awake      Independent Airway: airway patency satisfactory and stable  Dentition: dentition unchanged  Vital Signs Stable: post-procedure vital signs reviewed and stable  Report to RN Given: handoff report given  Patient transferred to: Medical/Surgical Unit    Handoff Report: Identifed the Patient, Identified the Reponsible Provider, Reviewed the pertinent medical history, Discussed the surgical course, Reviewed Intra-OP anesthesia mangement and issues during anesthesia, Set expectations for post-procedure period and Allowed opportunity for questions and acknowledgement of understanding      Vitals:  Vitals Value Taken Time   BP     Temp     Pulse     Resp     SpO2         Electronically Signed By: EDDI Hook CRNA  July 23, 2025  8:46 AM

## 2025-07-23 NOTE — PROGRESS NOTES
"   07/23/25 1600   Appointment Info   Signing Clinician's Name / Credentials (PT) Serene Bell, PT, DPT   Living Environment   People in Home spouse   Current Living Arrangements house   Home Accessibility stairs to enter home;stairs within home   Number of Stairs, Main Entrance 6   Stair Railings, Main Entrance other (see comments)  (Chair lift)   Number of Stairs, Within Home, Primary greater than 10 stairs   Stair Railings, Within Home, Primary other (see comments)  (Chair lift)   Transportation Anticipated family or friend will provide   Self-Care   Usual Activity Tolerance moderate   Current Activity Tolerance fair   Equipment Currently Used at Home none   Fall history within last six months yes   Number of times patient has fallen within last six months 3   Activity/Exercise/Self-Care Comment Patient's spouse purchased a 4WW and a wheelchair to use as needed.   General Information   Onset of Illness/Injury or Date of Surgery 07/22/25   Referring Physician Luis Slade, DAYNA   Patient/Family Therapy Goals Statement (PT) Return home   Pertinent History of Current Problem (include personal factors and/or comorbidities that impact the POC) Per chart, \"Yovani Bauman is a 74 year old male with PMHx significant for atrial fibrillation, prostate cancer, DM type II, hypertension, HLD, and anxiety.  He presented to the ER with multiple days of bright red blood per rectum.\"   Existing Precautions/Restrictions fall   Strength (Manual Muscle Testing)   Strength (Manual Muscle Testing) Deficits observed during functional mobility   Bed Mobility   Comment, (Bed Mobility) Patient up in recliner.   Transfers   Transfers sit-stand transfer   Transfer Safety Concerns Noted losing balance backward   Impairments Contributing to Impaired Transfers impaired balance;decreased strength   Sit-Stand Transfer   Sit-Stand [place] (Transfers) contact guard;verbal cues   Assistive Device (Sit-Stand Transfers) walker, " front-wheeled   Gait/Stairs (Locomotion)   [place] Level (Gait) contact guard;verbal cues   Assistive Device (Gait) walker, front-wheeled   Distance in Feet (Gait) 10'   Pattern (Gait) step-to   Deviations/Abnormal Patterns (Gait) base of support, wide;gabbi decreased   Clinical Impression   Criteria for Skilled Therapeutic Intervention Yes, treatment indicated   PT Diagnosis (PT) Impaired functional mobility   Influenced by the following impairments Impaired balance, decreased strength   Functional limitations due to impairments Transfers, gait   Clinical Presentation (PT Evaluation Complexity) stable   Clinical Presentation Rationale Patient presents as medically diagnosed.   Clinical Decision Making (Complexity) low complexity   Planned Therapy Interventions (PT) balance training;gait training;home exercise program;patient/family education;strengthening;transfer training;progressive activity/exercise;home program guidelines   Risk & Benefits of therapy have been explained evaluation/treatment results reviewed;care plan/treatment goals reviewed;patient;spouse/significant other   PT Total Evaluation Time   PT Eval, Low Complexity Minutes (10436) 10   Physical Therapy Goals   PT Frequency Daily   PT Predicted Duration/Target Date for Goal Attainment 07/30/25   PT Goals Transfers;Gait   PT: Transfers Supervision/stand-by assist;Sit to/from stand;Assistive device  (4WW)   PT: Gait Supervision/stand-by assist;Assistive device;Greater than 200 feet  (4WW)   Interventions   Interventions Quick Adds Gait Training;Therapeutic Activity   Therapeutic Activity   Therapeutic Activities: dynamic activities to improve functional performance Minutes (85368) 12   Symptoms Noted During/After Treatment None   Treatment Detail/Skilled Intervention Patient up in recliner, agreeable to PT. Patient attempted to use urinal prior to ambulation and completed multiple sit<>stands with CGA. Assisted patient with donning brief and  shorts in sitting/standing.   Gait Training   Gait Training Minutes (87698) 8   Symptoms Noted During/After Treatment (Gait Training) fatigue   Treatment Detail/Skilled Intervention Patient ambulated in hallway with CGA and FWW. Wide LAURYN observed throughout with decreased pace and step length. 1 standing rest break needed.   Distance in Feet 150'   [place] Level (Gait Training) contact guard   Physical Assistance Level (Gait Training) 1 person assist   Weight Bearing (Gait Training) full weight-bearing   Assistive Device (Gait Training) rolling walker  (FWW)   PT Discharge Planning   PT Plan Bring layne 4WW for transfers and ambulation   PT Discharge Recommendation (DC Rec) home with assist;home with home care physical therapy   PT Rationale for DC Rec Patient's spouse is very supportive and able to assist as needed. Patient may benefit from home PT for continued mobility and strength training to prevent falls.   PT Brief overview of current status CGA with FWW x 160'.   PT Total Distance Amb During Session (feet) 160   PT Equipment Needed at Discharge walker, rolling  (4WW)   Physical Therapy Time and Intention   Timed Code Treatment Minutes 20   Total Session Time (sum of timed and untimed services) 30

## 2025-07-23 NOTE — ANESTHESIA PREPROCEDURE EVALUATION
Anesthesia Pre-Procedure Evaluation    Patient: Yovani Bauman   MRN: [id number] : 1950          Procedure : Procedure(s):  COLONOSCOPY  ESOPHAGOGASTRODUODENOSCOPY         Past Medical History:   Diagnosis Date    ASCVD (arteriosclerotic cardiovascular disease)     Atrial flutter (H)     Depression     Edema     GERD (gastroesophageal reflux disease)     High triglycerides     HTN (hypertension)     Low vitamin B12 level     Metabolic syndrome     TRENTON on CPAP     CPAP    Prostate CA (H)       Past Surgical History:   Procedure Laterality Date    CHOLECYSTECTOMY      CRYOABLATION  2019    HYDROCELECTOMY SCROTAL      KNEE SURGERY      MA SURG DIAGNOSTIC EXAM, ANORECTAL N/A 2019    Procedure: EXAM UNDER ANESTHESIA WITH EXCISION OF ANAL SKIN TAG;  Surgeon: Lenny Ramsey MD;  Location: [facility];  Service: General    PROSTATE SURGERY      VASECTOMY        Allergies   Allergen Reactions    Other Environmental Allergy Unknown     Cockroaches, dust mites....the patient had allergy testing done and showed he might me allergic.  Never has had reaction    Shellfish Containing Products [Shellfish-Derived Products] Unknown     Pt did allergy testing and showed shellfish, pt has never had a reaction      Social History     Tobacco Use    Smoking status: Never    Smokeless tobacco: Never   Substance Use Topics    Alcohol use: Yes     Comment: Alcoholic Drinks/day: occassional      Wt Readings from Last 1 Encounters:   25 (!) 142.4 kg (313 lb 15 oz)        Anesthesia Evaluation            ROS/MED HX  ENT/Pulmonary:     (+) sleep apnea, uses CPAP,                                      Neurologic:       Cardiovascular:     (+)  hypertension- -   -  - -                        dysrhythmias, a-flutter,             METS/Exercise Tolerance:     Hematologic:       Musculoskeletal:       GI/Hepatic:     (+) GERD,                   Renal/Genitourinary:     (+) renal disease, type: CRI,            Endo:    "    Psychiatric/Substance Use:     (+) psychiatric history depression       Infectious Disease:       Malignancy:       Other:              Physical Exam  Airway  TM distance: >3 FB  Neck ROM: full  Mouth opening: >= 4 cm    Cardiovascular   Rhythm: regular  Rate: normal rate     Dental   (+) Minor Abnormalities - some fillings, tiny chips      Pulmonary Breath sounds clear to auscultation        Neurological   He appears awake, alert and oriented x3.    Other Findings       OUTSIDE LABS:  CBC:   Lab Results   Component Value Date    WBC 13.3 (H) 07/22/2025    WBC 8.6 12/18/2021    HGB 12.2 (L) 07/22/2025    HGB 8.4 (L) 12/18/2021    HCT 38.1 (L) 07/22/2025    HCT 26.2 (L) 12/18/2021     07/22/2025     12/18/2021     BMP:   Lab Results   Component Value Date     07/22/2025     12/18/2021    POTASSIUM 3.5 07/22/2025    POTASSIUM 3.7 12/18/2021    CHLORIDE 101 07/22/2025    CHLORIDE 106 12/18/2021    CO2 27 07/22/2025    CO2 26 12/18/2021    BUN 23.5 (H) 07/22/2025    BUN 25 (H) 12/18/2021    CR 1.46 (H) 07/22/2025    CR 1.33 (H) 12/18/2021    GLC 96 07/23/2025    GLC 97 07/22/2025     COAGS:   Lab Results   Component Value Date    PTT 40 (H) 07/22/2025    INR 3.27 (H) 07/22/2025     POC: No results found for: \"BGM\", \"HCG\", \"HCGS\"  HEPATIC:   Lab Results   Component Value Date    ALBUMIN 3.8 07/22/2025    PROTTOTAL 6.7 07/22/2025    ALT 27 07/22/2025    AST 24 07/22/2025    ALKPHOS 176 (H) 07/22/2025    BILITOTAL 0.8 07/22/2025     OTHER:   Lab Results   Component Value Date    LACT 2.3 (H) 12/15/2021    A1C 5.9 (H) 07/22/2025    LARRY 10.0 07/22/2025    MAG 1.9 12/16/2021    CRP 2.0 (H) 12/15/2021       Anesthesia Plan    ASA Status:  3      NPO Status: NPO Appropriate   Anesthesia Type: MAC.  Induction: intravenous.   Techniques and Equipment:       - Monitoring Plan: standard ASA monitoring     Consents    Anesthesia Plan(s) and associated risks, benefits, and realistic alternatives " discussed. Questions answered and patient/representative(s) expressed understanding.     - Discussed:     - Discussed with:  Patient        - Pt is DNR/DNI Status: DNR     - Suspend during perioperative period? Yes   Blood Consent:      - Discussed with: patient.     Postoperative Care    Pain management: non-narcotic analgesics.     Comments:                   Latasha Valiente MD    I have reviewed the pertinent notes and labs in the chart from the past 30 days and (re)examined the patient.  Any updates or changes from those notes are reflected in this note.    Clinically Significant Risk Factors Present on Admission                # Drug Induced Coagulation Defect: home medication list includes an anticoagulant medication    # Hypertension: Noted on problem list

## 2025-07-23 NOTE — PROGRESS NOTES
[facility]    Medicine Progress Note - Hospitalist Service    Date of Admission:  7/22/2025    Assessment & Plan      Yovani Bauman is a 74 year old male with PMHx significant for atrial fibrillation, prostate cancer, DM type II, hypertension, HLD, and anxiety.  He presented to the ER with multiple days of bright red blood per rectum.  Admitted for further care management     # Bright red blood per rectum   # Iron deficiency anemia, acute on chronic  Patient reports several days of bright red blood per rectum prompting admission.  On arrival, hemoglobin 12.2.   - GI consult, discussed cares at bedside.  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  Recommended MiraLAX in case constipation contributing.  Recommended sucralfate enemas, if hematochezia recurs.  -S/p EGD 7/23-suspicious for short segment Kiran's esophagus and lower third of esophagus, 4 cm hiatal hernia.  Biopsies obtained.  No gastric or duodenal ulcers.  - Resume diet  - HOLD Xarelto x 3 days  - IV Protonix 40 mg, received yesterday, started p.o. today  - Continue PTA iron supplement   - CBC and BMP in the AM      #Atrial fibrillation on anticoagulation   #Hypertension  #HLD  Patient had nuclear stress test on 6/19/2025 due to dyspnea which did not show any wall motion abnormalities or inducible ischemia. Echo from the same time shows EF 65%, biatrial enlargement   - Hold Xarelto due to GI bleed for 3 days  - Continue PTA hydrochlorothiazide 5 mg daily, losartan 100 mg daily, metoprolol 25 mg daily, and Diltiazem  mg daily   - PTA atorvastatin 20 mg     # Chronic Cough   # Leukocytosis  WBC 13.3 on arrival. He is on chronic prednisone which could explain his.  However, he does report an ongoing cough for approximately 2 months with EVANS, which he had a stress test for (see below).  He is bringing up yellow sputum.  He did have a chest x-ray on 6/5 which  "showed no acute abnormality.  He denies urinary symptoms.  He denies known fevers during this time. He does not smoke.   - Consider CT chest as an outpatient      # Weakness, multiple falls   He reports multiple falls over the last several months.  Does feel little weaker since starting Mounjaro about 1 month ago, possible muscle wasting.  He is seeing PT/OT as an outpatient.  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   - non-urgent follow up with renal US as outpatient      # DM type II  # Obesity, Body mass index is 41.45 kg/m .   Takes Mounjaro injections weekly. He does report about 14 lbs weight loss since starting Mounjaro.  - Hold PTA Mounjaro  - Medium intensity sliding scale while inpatient     # Prostate cancer  - PTA abiraterone  daily s/p radiation treatment  - Leuprolide injection q 3 months (due 7/28)  - He is on prednisone 5 mg daily to prevent side effects from his chemo meds      #Anxiety  - PTA Wellbutrin 150 milligrams daily and Effexor 150 mg daily     #Severe TRENTON on CPAP  -CPAP    #Hypokalemia.  Replacement protocol added     Diet: Regular Diet Adult    DVT Prophylaxis: Pneumatic Compression Devices  Bell Catheter: Not present  Lines: None     Cardiac Monitoring: None  Code Status: No CPR- Do NOT Intubate      Clinically Significant Risk Factors        # Hypokalemia: Lowest K = 2.9 mmol/L in last 2 days, will replace as needed         # Coagulation Defect: INR = 1.55 (Ref range: 0.85 - 1.15) and/or PTT = 40 Seconds (Ref range: 22 - 38 Seconds), will monitor for bleeding    # Hypertension: Noted on problem list       # Morbid Obesity: Estimated body mass index is 41.45 kg/m  as calculated from the following:    Height as of this encounter: 1.854 m (6' 1\").    " Weight as of this encounter: 142.5 kg (314 lb 3.2 oz)., PRESENT ON ADMISSION         Social Drivers of Health      Disposition Plan     Medically Ready for Discharge: Anticipated Tomorrow      Eliza Keen MD  Hospitalist Service  [facility]  Securely message with [employer] (more info)  Text page via [employer] Paging/Directory   ______________________________________________________________________    Interval History   S/p endoscopic evaluation today. GI D/W results with patient and his wife.  Patient does not have recollection of the details from discussion with GI.  Wife took was able to recall details of the discussion.  Patient is on concrete.  Diet resumed.  He is feeling weak, and prefers to stay overnight in the hospital, and ensure stable ambulation-PT evaluation requested.    Physical Exam   Vital Signs: Temp: 98.1  F (36.7  C) Temp src: Oral BP: 135/64 Pulse: 75   Resp: 20 SpO2: 97 % O2 Device: None (Room air)    Weight: 314 lbs 3.2 oz  General: Alert and oriented x 3. Not in obvious distress.  HEENT: NC, AT. Neck- supple, No JVP elevation, lymphadenopathy or thyromegaly. Trachea-central.  Chest: Clear to auscultation bilaterally.  Heart: S1S2 regular. No M/R/G.  Abdomen: Obese, soft. NT, ND. No organomegaly. Bowel sounds- active.  Back: No spine tenderness. No CVA tenderness.  Extremities: No leg swelling. Peripheral pulses 2+ bilaterally.  Neuro: Cranial nerves 1-12 grossly normal. No focal neurological deficit    Medical Decision Making       51 MINUTES SPENT BY ME on the date of service doing chart review, history, exam, documentation & further activities per the note.      Data     I have personally reviewed the following data over the past 24 hrs:    12.0 (H)  \   12.8 (L)   / 222     140 100 20.4 /  169 (H)   2.9 (L) 25 1.19 (H) \     TSH: N/A T4: N/A A1C: N/A     INR:  1.55 (H) PTT:  N/A   D-dimer:  N/A Fibrinogen:  N/A       Imaging results reviewed over the past 24 hrs:    No results found for this or any previous visit (from the past 24 hours).    This document is created with the help of Dragon dictation system. All grammatical errors are unintentional.

## 2025-07-23 NOTE — ANESTHESIA POSTPROCEDURE EVALUATION
Patient: Yovani Bauman    Procedure: Procedure(s):  COLONOSCOPY  ESOPHAGOGASTRODUODENOSCOPY WITH GASTRIC BIOPSIES       Anesthesia Type:  MAC    Note:  Disposition: Inpatient   Postop Pain Control:             Sign Out: Well controlled pain   PONV: No   Neuro/Psych:             Sign Out: Acceptable/Baseline neuro status   Airway/Respiratory:             Sign Out: Acceptable/Baseline resp. status   CV/Hemodynamics:             Sign Out: Acceptable CV status   Other NRE:    DID A NON-ROUTINE EVENT OCCUR? No           Last vitals:  Vitals:    07/22/25 2300 07/23/25 0629 07/23/25 0800   BP: 125/64 134/69 134/69   Pulse: 70 65 65   Resp: 18     Temp: 36.3  C (97.4  F)     SpO2: 95%         Electronically Signed By: Latasha Valiente MD  July 23, 2025  8:52 AM

## 2025-07-23 NOTE — PLAN OF CARE
Pt arrived from ED with wife at around 1630; Settled in room and started on bowel prep. Report given to Serene SHEEHAN.

## 2025-07-23 NOTE — UTILIZATION REVIEW
Admission Status; Secondary Review Determination       Under the authority of the Utilization Management Committee, the utilization review process indicated a secondary review on the above patient. The review outcome is based on review of the medical records, discussions with staff, and applying clinical experience noted on the date of the review.     (x) Inpatient Status Appropriate - This patient's medical care is consistent with medical management for inpatient care and reasonable inpatient medical practice.     RATIONALE FOR DETERMINATION     75 yo male pt with a PMH of radiation treatment for prostate cancer and chronic prednisone who presents to the ED with episodes of rectal bleeding.  Chronically on xarelto for a fib; INR also noted to be supratherapeutic so Vit K dose x 1 given; this delayed recommended endoscopies.  GI consulted; underwent EGD with evidence of gastritis and possible ayala's; colonoscopy with poor prep and evidence of radial proctopathy in the distal rectum.  Needing to hold xarelto for 3-5 days.  Remaining in the hospital yet today for close clinical monitoring of symptoms with slow diet advancement and repeat hemoglobin in the am before safe tod discharge.      At the time of admission with the information available to the attending physician more than 2 nights Hospital complex care was anticipated, based on patient risk of adverse outcome if treated as outpatient and complex care required. Inpatient admission is appropriate based on the Medicare guidelines.     This document was produced using voice recognition software       The information on this document is developed by the utilization review team in order for the business office to ensure compliance. This only denotes the appropriateness of proper admission status and does not reflect the quality of care rendered.   The definitions of Inpatient Status and Observation Status used in making the determination above are those provided  in the CMS Coverage Manual, Chapter 1 and Chapter 6, section 70.4.         Sincerely,     Cassidy Summers, DO  Utilization Review  Physician Advisor  [facility].

## 2025-07-24 ENCOUNTER — APPOINTMENT (OUTPATIENT)
Dept: PHYSICAL THERAPY | Facility: HOSPITAL | Age: 75
DRG: 394 | End: 2025-07-24
Payer: MEDICARE

## 2025-07-24 VITALS
BODY MASS INDEX: 41.64 KG/M2 | TEMPERATURE: 98.4 F | OXYGEN SATURATION: 96 % | DIASTOLIC BLOOD PRESSURE: 61 MMHG | WEIGHT: 314.2 LBS | HEART RATE: 62 BPM | HEIGHT: 73 IN | SYSTOLIC BLOOD PRESSURE: 115 MMHG | RESPIRATION RATE: 20 BRPM

## 2025-07-24 PROBLEM — K59.01 SLOW TRANSIT CONSTIPATION: Status: ACTIVE | Noted: 2025-07-24

## 2025-07-24 PROBLEM — E66.01 MORBID OBESITY (H): Status: ACTIVE | Noted: 2025-07-24

## 2025-07-24 PROBLEM — M62.81 GENERALIZED MUSCLE WEAKNESS: Status: ACTIVE | Noted: 2025-07-24

## 2025-07-24 LAB
ANION GAP SERPL CALCULATED.3IONS-SCNC: 9 MMOL/L (ref 7–15)
BUN SERPL-MCNC: 15.3 MG/DL (ref 8–23)
CALCIUM SERPL-MCNC: 9.6 MG/DL (ref 8.8–10.4)
CHLORIDE SERPL-SCNC: 104 MMOL/L (ref 98–107)
CREAT SERPL-MCNC: 1.15 MG/DL (ref 0.67–1.17)
EGFRCR SERPLBLD CKD-EPI 2021: 68 ML/MIN/1.73M2
ERYTHROCYTE [DISTWIDTH] IN BLOOD BY AUTOMATED COUNT: 16 % (ref 10–15)
GLUCOSE BLDC GLUCOMTR-MCNC: 103 MG/DL (ref 70–99)
GLUCOSE BLDC GLUCOMTR-MCNC: 121 MG/DL (ref 70–99)
GLUCOSE SERPL-MCNC: 103 MG/DL (ref 70–99)
HCO3 SERPL-SCNC: 29 MMOL/L (ref 22–29)
HCT VFR BLD AUTO: 34.5 % (ref 40–53)
HGB BLD-MCNC: 11.3 G/DL (ref 13.3–17.7)
MAGNESIUM SERPL-MCNC: 1.8 MG/DL (ref 1.7–2.3)
MCH RBC QN AUTO: 27.1 PG (ref 26.5–33)
MCHC RBC AUTO-ENTMCNC: 32.8 G/DL (ref 31.5–36.5)
MCV RBC AUTO: 83 FL (ref 78–100)
PATH REPORT.COMMENTS IMP SPEC: NORMAL
PATH REPORT.COMMENTS IMP SPEC: NORMAL
PATH REPORT.FINAL DX SPEC: NORMAL
PATH REPORT.GROSS SPEC: NORMAL
PATH REPORT.MICROSCOPIC SPEC OTHER STN: NORMAL
PATH REPORT.RELEVANT HX SPEC: NORMAL
PHOTO IMAGE: NORMAL
PLATELET # BLD AUTO: 216 10E3/UL (ref 150–450)
POTASSIUM SERPL-SCNC: 3.4 MMOL/L (ref 3.4–5.3)
POTASSIUM SERPL-SCNC: 3.5 MMOL/L (ref 3.4–5.3)
RBC # BLD AUTO: 4.17 10E6/UL (ref 4.4–5.9)
SODIUM SERPL-SCNC: 142 MMOL/L (ref 135–145)
WBC # BLD AUTO: 9.8 10E3/UL (ref 4–11)

## 2025-07-24 PROCEDURE — 99239 HOSP IP/OBS DSCHRG MGMT >30: CPT | Performed by: INTERNAL MEDICINE

## 2025-07-24 PROCEDURE — 250N000012 HC RX MED GY IP 250 OP 636 PS 637: Performed by: STUDENT IN AN ORGANIZED HEALTH CARE EDUCATION/TRAINING PROGRAM

## 2025-07-24 PROCEDURE — 83735 ASSAY OF MAGNESIUM: CPT | Performed by: INTERNAL MEDICINE

## 2025-07-24 PROCEDURE — 36415 COLL VENOUS BLD VENIPUNCTURE: CPT | Performed by: INTERNAL MEDICINE

## 2025-07-24 PROCEDURE — 999N000157 HC STATISTIC RCP TIME EA 10 MIN

## 2025-07-24 PROCEDURE — 82310 ASSAY OF CALCIUM: CPT | Performed by: STUDENT IN AN ORGANIZED HEALTH CARE EDUCATION/TRAINING PROGRAM

## 2025-07-24 PROCEDURE — 88305 TISSUE EXAM BY PATHOLOGIST: CPT | Mod: 26 | Performed by: PATHOLOGY

## 2025-07-24 PROCEDURE — 250N000013 HC RX MED GY IP 250 OP 250 PS 637: Performed by: INTERNAL MEDICINE

## 2025-07-24 PROCEDURE — 97116 GAIT TRAINING THERAPY: CPT | Mod: GP | Performed by: PHYSICAL THERAPIST

## 2025-07-24 PROCEDURE — 250N000013 HC RX MED GY IP 250 OP 250 PS 637: Performed by: STUDENT IN AN ORGANIZED HEALTH CARE EDUCATION/TRAINING PROGRAM

## 2025-07-24 PROCEDURE — 85041 AUTOMATED RBC COUNT: CPT | Performed by: STUDENT IN AN ORGANIZED HEALTH CARE EDUCATION/TRAINING PROGRAM

## 2025-07-24 PROCEDURE — 80048 BASIC METABOLIC PNL TOTAL CA: CPT | Performed by: INTERNAL MEDICINE

## 2025-07-24 PROCEDURE — 36415 COLL VENOUS BLD VENIPUNCTURE: CPT | Performed by: STUDENT IN AN ORGANIZED HEALTH CARE EDUCATION/TRAINING PROGRAM

## 2025-07-24 RX ORDER — POTASSIUM CHLORIDE 750 MG/1
20 TABLET, EXTENDED RELEASE ORAL DAILY
Qty: 60 TABLET | Refills: 0 | Status: SHIPPED | OUTPATIENT
Start: 2025-07-24 | End: 2025-08-23

## 2025-07-24 RX ORDER — RIVAROXABAN 20 MG/1
20 TABLET, FILM COATED ORAL
Status: SHIPPED
Start: 2025-07-26

## 2025-07-24 RX ORDER — POTASSIUM CHLORIDE 1500 MG/1
40 TABLET, EXTENDED RELEASE ORAL ONCE
Status: COMPLETED | OUTPATIENT
Start: 2025-07-24 | End: 2025-07-24

## 2025-07-24 RX ORDER — POLYETHYLENE GLYCOL 3350 17 G/17G
17 POWDER, FOR SOLUTION ORAL DAILY
Qty: 510 G | Refills: 0 | Status: SHIPPED | OUTPATIENT
Start: 2025-07-24

## 2025-07-24 RX ADMIN — PREDNISONE 5 MG: 5 TABLET ORAL at 08:40

## 2025-07-24 RX ADMIN — VENLAFAXINE HYDROCHLORIDE 150 MG: 75 CAPSULE, EXTENDED RELEASE ORAL at 08:40

## 2025-07-24 RX ADMIN — FERROUS SULFATE TAB 325 MG (65 MG ELEMENTAL FE) 325 MG: 325 (65 FE) TAB at 08:40

## 2025-07-24 RX ADMIN — METOPROLOL SUCCINATE 25 MG: 25 TABLET, FILM COATED, EXTENDED RELEASE ORAL at 09:01

## 2025-07-24 RX ADMIN — PANTOPRAZOLE SODIUM 40 MG: 40 TABLET, DELAYED RELEASE ORAL at 06:22

## 2025-07-24 RX ADMIN — ABIRATERONE ACETATE 500 MG: 250 TABLET ORAL at 08:54

## 2025-07-24 RX ADMIN — POTASSIUM CHLORIDE 10 MEQ: 750 TABLET, EXTENDED RELEASE ORAL at 09:02

## 2025-07-24 RX ADMIN — POTASSIUM CHLORIDE 40 MEQ: 1500 TABLET, EXTENDED RELEASE ORAL at 08:36

## 2025-07-24 RX ADMIN — BUPROPION HYDROCHLORIDE 150 MG: 150 TABLET, FILM COATED, EXTENDED RELEASE ORAL at 08:40

## 2025-07-24 ASSESSMENT — ACTIVITIES OF DAILY LIVING (ADL)
ADLS_ACUITY_SCORE: 61
ADLS_ACUITY_SCORE: 61
ADLS_ACUITY_SCORE: 64
ADLS_ACUITY_SCORE: 64
ADLS_ACUITY_SCORE: 61
ADLS_ACUITY_SCORE: 64
ADLS_ACUITY_SCORE: 61
ADLS_ACUITY_SCORE: 64
ADLS_ACUITY_SCORE: 61
ADLS_ACUITY_SCORE: 64
ADLS_ACUITY_SCORE: 61
ADLS_ACUITY_SCORE: 65
ADLS_ACUITY_SCORE: 64

## 2025-07-24 NOTE — PLAN OF CARE
Shift from 0700 to 1930--    Problem: Pain Acute  Goal: Optimal Pain Control and Function  Outcome: Progressing  Intervention: Develop Pain Management Plan  Recent Flowsheet Documentation  Taken 7/23/2025 1627 by Li Enciso RN  Pain Management Interventions: emotional support  Taken 7/23/2025 1130 by Li Enciso RN  Pain Management Interventions: emotional support  Intervention: Prevent or Manage Pain  Recent Flowsheet Documentation  Taken 7/23/2025 1628 by Li Enciso RN  Sensory Stimulation Regulation: care clustered  Bowel Elimination Promotion:   adequate fluid intake promoted   ambulation promoted  Medication Review/Management: medications reviewed  Taken 7/23/2025 0930 by Li Enciso RN  Sensory Stimulation Regulation: care clustered  Sleep/Rest Enhancement: family presence promoted  Bowel Elimination Promotion:   adequate fluid intake promoted   ambulation promoted     Problem: Gastrointestinal Bleeding  Goal: Optimal Coping with Acute Illness  Outcome: Progressing  Goal: Hemostasis  Outcome: Progressing       Goal Outcome Evaluation:    Pt left for GI lab around 0645; Then returned around 0930.   Pt tolerated well. No signs of bleeding, pain or nausea.  Tolerated and advanced diet to regular diet.   K+ 2.9; Started on K+ protocol. Given oral KCL.   Recheck was 3.3; Oral potassium ordered again per protocol.   Mg+ protocol started and Mg+ was 1.9;Recheck in am.

## 2025-07-24 NOTE — PLAN OF CARE
Goal Outcome Evaluation: Patient is A/Ox3 disoriented to time, up with x1-2 assist with walker, very weak legs buckle. Pt impulsive, tried to ambulate without assistance, noted pt turned off chair alarm. Patient accepted external cath for a period of time then persisted to pull that off, offered urinal and ambulation assist to Br. Patient has good urine output in external cath, toilet and some urine incontinence, no CO of loose stools and constipation. Patient has a hx of falls, all alarms audible and working, frequent rounding. At 2300 patient pulled out PIV, called , told unit coordinator that someone pulled bandage off and blood is everywhere. I found patient in recliner with PIV in hand, blood on gown. Pt stated that he pulled it out, and did not want it because he is going home soon. I stated that it is late at night and that he is to stay the night at the hospital. VSS on RA, wears CPAP at night, NO pain, N/V. K and Mag protocols. K replaced and rechecked at MN 3.5 recheck in AM, Mag recheck in AM. BG at bedtime 151 coverage given per order. Continue to monitor. Patient is in recliner watching Tv currently, ordered breakfast at 0630 .    Problem: Delirium  Goal: Improved Attention and Thought Clarity  Outcome: Progressing  Intervention: Maximize Cognitive Function  Recent Flowsheet Documentation  Taken 7/23/2025 2339 by Serene Leggett RN  Sensory Stimulation Regulation: care clustered  Reorientation Measures: clock in view  Taken 7/23/2025 1947 by Serene Leggett RN  Sensory Stimulation Regulation: care clustered  Reorientation Measures: clock in view     Problem: Delirium  Goal: Improved Sleep  Outcome: Progressing  Intervention: Promote Sleep  Recent Flowsheet Documentation  Taken 7/23/2025 2339 by Serene Leggett RN  Sleep/Rest Enhancement: awakenings minimized  Taken 7/23/2025 1947 by Serene Leggett RN  Sleep/Rest Enhancement: family presence promoted     Problem: Adult Inpatient  Plan of Care  Goal: Absence of Hospital-Acquired Illness or Injury  Intervention: Identify and Manage Fall Risk  Recent Flowsheet Documentation  Taken 7/23/2025 2339 by Serene Leggett RN  Safety Promotion/Fall Prevention:   activity supervised   assistive device/personal items within reach   nonskid shoes/slippers when out of bed   supervised activity   patient and family education   safety round/check completed  Taken 7/23/2025 1947 by Serene Leggett RN  Safety Promotion/Fall Prevention:   activity supervised   assistive device/personal items within reach   nonskid shoes/slippers when out of bed   supervised activity   patient and family education   safety round/check completed     Problem: Pain Acute  Goal: Optimal Pain Control and Function  Outcome: Progressing  Intervention: Optimize Psychosocial Wellbeing  Recent Flowsheet Documentation  Taken 7/23/2025 2339 by Serene Leggett RN  Supportive Measures:   active listening utilized   positive reinforcement provided   self-care encouraged   verbalization of feelings encouraged  Taken 7/23/2025 1947 by Serene Leggett RN  Supportive Measures:   active listening utilized   positive reinforcement provided   self-care encouraged   verbalization of feelings encouraged  Diversional Activities: (cell phone)   television   other (see comments)  Intervention: Prevent or Manage Pain  Recent Flowsheet Documentation  Taken 7/23/2025 2339 by Serene Leggett RN  Sensory Stimulation Regulation: care clustered  Sleep/Rest Enhancement: awakenings minimized  Bowel Elimination Promotion:   adequate fluid intake promoted   ambulation promoted  Medication Review/Management: medications reviewed  Taken 7/23/2025 1947 by Serene Leggett RN  Sensory Stimulation Regulation: care clustered  Sleep/Rest Enhancement: family presence promoted  Medication Review/Management: medications reviewed     Problem: Gastrointestinal Bleeding  Goal: Optimal Coping with Acute  Illness  Outcome: Progressing  Intervention: Optimize Psychosocial Response  Recent Flowsheet Documentation  Taken 7/23/2025 2339 by Serene Leggett, RN  Supportive Measures:   active listening utilized   positive reinforcement provided   self-care encouraged   verbalization of feelings encouraged  Taken 7/23/2025 1947 by Serene Leggett, RN  Supportive Measures:   active listening utilized   positive reinforcement provided   self-care encouraged   verbalization of feelings encouraged   COLONOSCOPY; ESOPHAGOGASTRODUODENOSCOPY WITH GASTRIC BIOPSIES was done on 7/23. Monitor stools.     Problem: Adult Inpatient Plan of Care  Goal: Readiness for Transition of Care  Outcome: Progressing        Medically Ready for Discharge: Anticipated 7/24          Serene Leggett, RN

## 2025-07-24 NOTE — PLAN OF CARE
Occupational Therapy: Orders received. Chart reviewed and discussed with care team.? Occupational Therapy not indicated at this time, pt is close to baseline and has supportive spouse at home.? Defer discharge recommendations to interdisciplinary team.? Will complete orders.     Oralia Morris OT, 7/24/2025

## 2025-07-24 NOTE — PLAN OF CARE
Problem: Delirium  Goal: Improved Attention and Thought Clarity  Outcome: Progressing     Problem: Gastrointestinal Bleeding  Goal: Optimal Coping with Acute Illness  Outcome: Progressing  Goal: Hemostasis  Outcome: Progressing   Goal Outcome Evaluation:         A+Ox4 today. Up to the bathroom with assist x 1, walker and gait belt. Impulsive at times. Chair alarm on. I/O.   Denies pain. Tolerating regular diet.   K and Mg protocol, K replaced per orders.  Plan to discharge home today with family ride.

## 2025-07-24 NOTE — PLAN OF CARE
Physical Therapy Discharge Summary    Reason for therapy discharge:    Discharged to home with home therapy.    Progress towards therapy goal(s). See goals on Care Plan in [facility] electronic health record for goal details.  Goals partially met.  Barriers to achieving goals:   discharge from facility.    Therapy recommendation(s):    Continued therapy is recommended.  Rationale/Recommendations:  recommend home or outpatient PT to improve balance and reduce fall risk.    Sparkle Law, PT  7/24/2025

## 2025-07-24 NOTE — PROGRESS NOTES
SPIRITUAL HEALTH SERVICES NOTE  [facility]; P2    Reason for Visit: admission screening response    SPIRITUAL ASSESSMENT  In [facility] spirits; feels ready for discharge  Rastafari is aware of his admission    Patient/Family Understanding of Illness and Goals of Care - Saw Yovani due to admission screening response. He says that his bleeding has stopped and that he is going home today. He feels good about this plan and denies any concerns.     Distress and Loss - Not discussed during this visit    Strengths, Coping, and Resources - Yovani names his wife as a significant source of support and encouragement. He says that his daughter and his son both live within a few miles of his house. He identifies his four grandchildren as a source of pranay and motivation.     Meaning, Beliefs, and Spirituality - Yovani is Islam. He states that his [employer]regation is aware of his admission. No other needs identified at this time.     Plan of Care: No plans for follow-up at this time due to patient's anticipated discharge today/tomorrow.  available by patient or staff request.     Ana Phillip M.Div.    Office: [phone] (for non-urgent requests)  Please Zenaida or page through [facility] for time-sensitive requests

## 2025-07-24 NOTE — DISCHARGE SUMMARY
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    Weight as of this encounter: 142.5 kg (314 lb 3.2 oz).       Follow-ups Needed After Discharge   Follow-up Appointments       Hospital Follow-up with Existing Primary Care Provider (PCP)          Schedule Primary Care visit within: 14 Days        Discharge Disposition   Discharged to home  Condition at discharge: Stable    Hospital Course   Yovani Bauman is a 74 year old male with PMHx significant for atrial fibrillation, prostate cancer, DM type II, hypertension, HLD, and anxiety.  He presented to the ER with multiple days of bright red blood per rectum.  Admitted for further care management     # Bright red blood per rectum   # Iron deficiency anemia, acute on chronic  Patient reports several days of bright red blood per rectum prompting admission.  On arrival, hemoglobin 12.2.  Patient seen by GI.  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  Recommended MiraLAX in case constipation contributing.  Recommended sucralfate enemas, if hematochezia recurs-no recurrence after colonoscopy.  -S/p EGD 7/23-suspicious for short " segment Kiran's esophagus and lower third of esophagus, 4 cm hiatal hernia.  Biopsies obtained.  No gastric or duodenal ulcers.  Diet was resumed.  Xarelto held, okay to resume in 3-5 days, on 7/26.  - IV Protonix 40 mg, transition to p.o.  - Continue PTA iron supplement      #Atrial fibrillation on anticoagulation   #Hypertension  #HLD  Patient had nuclear stress test on 6/19/2025 due to dyspnea which did not show any wall motion abnormalities or inducible ischemia. Echo from the same time shows EF 65%, biatrial enlargement   - Held Xarelto due to GI bleed for 3 days as above  - Continued PTA hydrochlorothiazide 5 mg daily, losartan 100 mg daily, metoprolol 25 mg daily, and Diltiazem  mg daily, atorvastatin 20 mg.     # Chronic Cough   # Leukocytosis-resolved  WBC 13.3 on arrival. He is on chronic prednisone which could explain his.  However, he does report an ongoing cough for approximately 2 months with EVANS, which he had a stress test for (see below).  He is bringing up yellow sputum.  He did have a chest x-ray on 6/5 which showed no acute abnormality.  He denies urinary symptoms.  He denies known fevers during this time. He does not smoke.  WBCs 9.8 on 7/24/2025.     # Weakness, multiple falls   He reports multiple falls over the last several months.  Does feel little weaker since starting Mounjaro about 1 month ago, possible muscle wasting.  He is seeing PT/OT as an outpatient.  - PT OT evaluated, recommending home PT.     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   - non-urgent follow up with renal US as outpatient      # DM type II  # Obesity, Body mass index is 41.45 kg/m .   Takes Mounjaro injections weekly. He does report about 14 lbs weight loss since starting Mounjaro.  - Held PTA Mounjaro  hospitalization, okay to resume after discharge, per previous dosing     # Prostate cancer  - PTA abiraterone  daily s/p radiation treatment  - Leuprolide injection q 3 months (due 7/28)  - He is on prednisone 5 mg daily to prevent side effects from his chemo meds      #Anxiety  - PTA Wellbutrin 150 milligrams daily and Effexor 150 mg daily     #Severe TRENTON on CPAP  -CPAP    #Hypokalemia.  Replacement protocol added     Diet: Regular Diet Adult    DVT Prophylaxis: Pneumatic Compression Devices  Bell Catheter: Not present  Lines: None     Cardiac Monitoring: None  Code Status: No CPR- Do NOT Intubate      Consultations This Hospital Stay   GASTROENTEROLOGY IP CONSULT  PHYSICAL THERAPY ADULT IP CONSULT  OCCUPATIONAL THERAPY ADULT IP CONSULT    Code Status   No CPR- Do NOT Intubate    Time Spent on this Encounter   I, Eliza Keen MD, personally saw the patient today and spent greater than 30 minutes discharging this patient.    Eliza Keen MD  [address] [address]  Phone: [phone]  Fax: [phone]  ______________________________________________________________________    Physical Exam   Vital Signs: Temp: 98.4  F (36.9  C) Temp src: Oral BP: 115/61 Pulse: 62   Resp: 20 SpO2: 96 % O2 Device: None (Room air)    Weight: 314 lbs 3.2 oz  General: Alert and oriented x 3. Not in obvious distress.  HEENT: NC, AT. Neck- supple, No JVP elevation, lymphadenopathy or thyromegaly. Trachea-central.  Chest: Clear to auscultation bilaterally.  Heart: S1S2 regular. No M/R/G.  Abdomen: Obese, soft. NT, ND. No organomegaly. Bowel sounds- active.  Back: No spine tenderness. No CVA tenderness.  Extremities: No leg swelling. Peripheral pulses 2+ bilaterally.  Neuro: Cranial nerves 1-12 grossly normal. No focal neurological deficit       Primary Care Physician   Shanda Jennings    Discharge Orders      Reason for your hospital stay    Rectal bleeding      Activity    Your activity upon discharge: activity as tolerated.  Home health care for physical therapy.     CPAP - Continue Home CPAP    Continue Home CPAP per home equipment settings.     Diet    Follow this diet upon discharge: Current Diet:Orders Placed This Encounter      Regular Diet Adult     Hospital Follow-up with Existing Primary Care Provider (PCP)    Follow-up with PCP in 1-2 weeks.  Follow-up with weight loss clinic, as previously scheduled.     Significant Results and Procedures   Results for orders placed or performed during the hospital encounter of 07/22/25   CTA GI Bleed    Narrative    EXAM: CTA GI BLEED  LOCATION: [facility]  DATE: 7/22/2025  INDICATION: BRBPR  COMPARISON: CT abdomen and pelvis from 06/03/2024  TECHNIQUE: CT angiogram abdomen pelvis during arterial phase of injection of IV contrast. 2D and 3D MIP reconstructions were performed by the CT technologist. Dose reduction techniques were used.  CONTRAST: isovue 370 90ml  FINDINGS:  ANGIOGRAM ABDOMEN/PELVIS: There is no evidence of intraluminal contrast extravasation within the bowel to suggest active GI bleeding at this time. The aorta is widely patent and normal in caliber. The celiac, superior mesenteric, renal, and inferior   mesenteric arteries are patent without aneurysm or clinically significant stenosis. The bilateral iliac arteries are patent.  LOWER CHEST: There is mild atelectasis and/or scarring in the imaged lung bases.  HEPATOBILIARY: Hepatic morphology is normal. No hepatic surface nodularity. No significant hepatic steatosis. There are a few subcentimeter hypoattenuating foci that are too small to adequately characterize but are statistically favored to represent   small cysts. The gallbladder is absent.  PANCREAS: Normal.  SPLEEN: Unremarkable. Few small splenules.  ADRENAL GLANDS: Normal.  KIDNEYS/BLADDER: A fat-containing lesion in the right kidney measures approximately 3.7 cm (series 10  image 164) likely represents an angiomyolipoma, slightly increased in size compared to the prior study from 2024 measured 3.3 cm. Additionally, there   are multiple large right renal cysts measuring up to 12 cm, requiring no follow-up. An area of mixed fat attenuation and stranding along the posterior aspect of the left kidney measures approximately 4.9 cm (series 10 image 196), not significantly   changed in size compared to the prior exam, possibly representing an additional angiomyolipoma, versus sequela of resolved prior left perinephric hematoma. A cyst along the upper pole left kidney measures 4.8 cm, requiring no follow-up. There is no   hydronephrosis. The urinary bladder is decompressed and not well evaluated.  BOWEL: No evidence of bowel obstruction. No significant focal bowel wall thickening.  LYMPH NODES: No lymphadenopathy.  PELVIC ORGANS: The prostate gland is absent, with multiple fiducial markers again seen in the prostatectomy bed.  MUSCULOSKELETAL: Multilevel degenerative changes throughout the imaged spine. Chronic appearing right-sided rib fractures.      Impression    IMPRESSION:  1.  No evidence of active GI bleeding at this time.  2.  Fat-containing lesion in the right kidney measuring up to 3.7 cm likely represents an angiomyolipoma, previously 3.3 cm on the prior study from 2024. An area of mixed fat attenuation and stranding along the posterior aspect of the left kidney   measures approximately 4.9 cm, not significantly changed in size compared to the prior exam, possibly representing an additional fat predominant angiomyolipoma, versus sequela of resolved prior left perinephric hematoma. Renal ultrasound could be   considered on a nonemergent basis for further characterization if clinically indicated.       Discharge Medications      Review of your medicines        CHANGE how you take these medications        Dose / Directions   potassium chloride rhett ER 10 MEQ CR tablet  Commonly known  as: KLOR-CON M10  This may have changed: how much to take  Used for: Hypokalemia    Dose: 20 mEq  Take 2 tablets (20 mEq) by mouth daily.  Quantity: 60 tablet  Refills: 0     XARELTO ANTICOAGULANT 20 MG Tabs tablet  This may have changed: These instructions start on July 26, 2025. If you are unsure what to do until then, ask your doctor or other care provider.  Used for: Typical atrial flutter (H)  Generic drug: rivaroxaban ANTICOAGULANT    Dose: 20 mg  Start taking on: July 26, 2025  Take 1 tablet (20 mg) by mouth daily (with dinner).  Refills: 0       CONTINUE these medicines which have NOT CHANGED        Dose / Directions   abiraterone 250 MG tablet  Commonly known as: ZYTIGA      Dose: 500 mg  Take 500 mg by mouth daily.  Refills: 0     atorvastatin 20 MG tablet  Commonly known as: LIPITOR      Dose: 20 mg  Take 20 mg by mouth At Bedtime  Refills: 0     buPROPion 150 MG 24 hr tablet  Commonly known as: WELLBUTRIN XL    Dose: 150 mg  Take 150 mg by mouth every morning.  Refills: 0     Eligard 22.5 MG  Generic drug: leuprolide (3 Month)      Dose: 22.5 mg  Inject 22.5 mg Subcutaneous every 3 months  Refills: 0     ferrous sulfate 325 (65 Fe) MG tablet  Commonly known as: FEROSUL      Dose: 325 mg  Take 325 mg by mouth daily with food.  Refills: 0     hydrochlorothiazide 25 MG tablet  Commonly known as: HYDRODIURIL      Dose: 25 mg  Take 25 mg by mouth daily.  Refills: 0     losartan 100 MG tablet  Commonly known as: COZAAR      Dose: 1 tablet  Take 1 tablet by mouth daily.  Refills: 0     metoprolol succinate ER 25 MG 24 hr tablet  Commonly known as: TOPROL XL      Dose: 25 mg  Take 25 mg by mouth daily.  Refills: 0     Mounjaro 5 MG/0.5ML Soaj auto-injector pen  Generic drug: tirzepatide      Dose: 5 mg  Inject 5 mg subcutaneously once a week. Fridays  Refills: 0     predniSONE 5 MG tablet  Commonly known as: DELTASONE      Dose: 5 mg  Take 5 mg by mouth daily.  Refills: 0     Tiadylt  MG 24 hr ER beaded  capsule  Generic drug: diltiazem ER      Dose: 420 mg  Take 420 mg by mouth daily.  Refills: 0     venlafaxine 150 MG 24 hr capsule  Commonly known as: EFFEXOR XR      Dose: 150 mg  Take 150 mg by mouth daily.  Refills: 0     MiraLAX 17 g p.o. daily.  30 days.       Where to get your medicines        These medications were sent to [facility]/pharmacy #[id number] - [address], [facility] [address]      Phone: [phone]   potassium chloride rhett ER 10 MEQ CR tablet       Allergies   Allergies   Allergen Reactions    Other Environmental Allergy Unknown     Cockroaches, dust mites....the patient had allergy testing done and showed he might me allergic.  Never has had reaction    Shellfish Containing Products [Shellfish-Derived Products] Unknown     Pt did allergy testing and showed shellfish, pt has never had a reaction   This document is created with the help of Dragon dictation system. All grammatical errors are unintentional.

## 2025-07-28 ENCOUNTER — PATIENT OUTREACH (OUTPATIENT)
Dept: CARE COORDINATION | Facility: CLINIC | Age: 75
End: 2025-07-28
Payer: MEDICARE

## (undated) DEVICE — SOLUTION WATER 1000ML BOTTLE R5000-01

## (undated) DEVICE — SUCTION MANIFOLD NEPTUNE 2 SYS 1 PORT 702-025-000

## (undated) DEVICE — TUBING SUCTION MEDI-VAC 1/4"X20' N620A

## (undated) DEVICE — FORCEP BIOPSY 2.3MM DISP COATED 000388

## (undated) RX ORDER — LIDOCAINE HYDROCHLORIDE 10 MG/ML
INJECTION, SOLUTION EPIDURAL; INFILTRATION; INTRACAUDAL; PERINEURAL
Status: DISPENSED
Start: 2025-07-23

## (undated) RX ORDER — PROPOFOL 10 MG/ML
INJECTION, EMULSION INTRAVENOUS
Status: DISPENSED
Start: 2025-07-23